# Patient Record
Sex: FEMALE | Race: WHITE | NOT HISPANIC OR LATINO | Employment: FULL TIME | URBAN - METROPOLITAN AREA
[De-identification: names, ages, dates, MRNs, and addresses within clinical notes are randomized per-mention and may not be internally consistent; named-entity substitution may affect disease eponyms.]

---

## 2017-10-20 ENCOUNTER — CONVERSION ENCOUNTER (OUTPATIENT)
Dept: MAMMOGRAPHY | Facility: HOSPITAL | Age: 47
End: 2017-10-20

## 2018-09-27 ENCOUNTER — OFFICE VISIT CONVERTED (OUTPATIENT)
Dept: FAMILY MEDICINE CLINIC | Facility: CLINIC | Age: 48
End: 2018-09-27
Attending: FAMILY MEDICINE

## 2019-02-19 ENCOUNTER — HOSPITAL ENCOUNTER (OUTPATIENT)
Dept: FAMILY MEDICINE CLINIC | Facility: CLINIC | Age: 49
Discharge: HOME | End: 2019-02-19
Attending: FAMILY MEDICINE

## 2019-02-19 ENCOUNTER — OFFICE VISIT CONVERTED (OUTPATIENT)
Dept: FAMILY MEDICINE CLINIC | Facility: CLINIC | Age: 49
End: 2019-02-19
Attending: FAMILY MEDICINE

## 2019-02-19 LAB
ALBUMIN SERPL-MCNC: 4.7 G/DL (ref 3.5–5)
ALBUMIN/GLOB SERPL: 1.3 {RATIO} (ref 1.4–2.6)
ALP SERPL-CCNC: 79 U/L (ref 42–98)
ALT SERPL-CCNC: 14 U/L (ref 10–40)
ANION GAP SERPL CALC-SCNC: 18 MMOL/L (ref 8–19)
AST SERPL-CCNC: 21 U/L (ref 15–50)
BASOPHILS # BLD AUTO: 0.05 10*3/UL (ref 0–0.2)
BASOPHILS NFR BLD AUTO: 0.9 % (ref 0–3)
BILIRUB SERPL-MCNC: 0.26 MG/DL (ref 0.2–1.3)
BUN SERPL-MCNC: 14 MG/DL (ref 5–25)
BUN/CREAT SERPL: 15 {RATIO} (ref 6–20)
CALCIUM SERPL-MCNC: 9.5 MG/DL (ref 8.7–10.4)
CHLORIDE SERPL-SCNC: 104 MMOL/L (ref 99–111)
CHOLEST SERPL-MCNC: 218 MG/DL (ref 107–200)
CHOLEST/HDLC SERPL: 4.4 {RATIO} (ref 3–6)
CONV ABS IMM GRAN: 0.01 10*3/UL (ref 0–0.2)
CONV CO2: 25 MMOL/L (ref 22–32)
CONV IMMATURE GRAN: 0.2 % (ref 0–1.8)
CONV TOTAL PROTEIN: 8.2 G/DL (ref 6.3–8.2)
CREAT UR-MCNC: 0.93 MG/DL (ref 0.5–0.9)
DEPRECATED RDW RBC AUTO: 41.1 FL (ref 36.4–46.3)
EOSINOPHIL # BLD AUTO: 0.32 10*3/UL (ref 0–0.7)
EOSINOPHIL # BLD AUTO: 5.6 % (ref 0–7)
ERYTHROCYTE [DISTWIDTH] IN BLOOD BY AUTOMATED COUNT: 12.6 % (ref 11.7–14.4)
FOLATE SERPL-MCNC: 14.3 NG/ML (ref 4.8–20)
GFR SERPLBLD BASED ON 1.73 SQ M-ARVRAT: >60 ML/MIN/{1.73_M2}
GLOBULIN UR ELPH-MCNC: 3.5 G/DL (ref 2–3.5)
GLUCOSE SERPL-MCNC: 100 MG/DL (ref 65–99)
HBA1C MFR BLD: 13.7 G/DL (ref 12–16)
HCT VFR BLD AUTO: 43.8 % (ref 37–47)
HDLC SERPL-MCNC: 49 MG/DL (ref 40–60)
LDLC SERPL CALC-MCNC: 139 MG/DL (ref 70–100)
LYMPHOCYTES # BLD AUTO: 1.64 10*3/UL (ref 1–5)
MCH RBC QN AUTO: 28 PG (ref 27–31)
MCHC RBC AUTO-ENTMCNC: 31.3 G/DL (ref 33–37)
MCV RBC AUTO: 89.6 FL (ref 81–99)
MONOCYTES # BLD AUTO: 0.5 10*3/UL (ref 0.2–1.2)
MONOCYTES NFR BLD AUTO: 8.8 % (ref 3–10)
NEUTROPHILS # BLD AUTO: 3.18 10*3/UL (ref 2–8)
NEUTROPHILS NFR BLD AUTO: 55.7 % (ref 30–85)
NRBC CBCN: 0 % (ref 0–0.7)
OSMOLALITY SERPL CALC.SUM OF ELEC: 297 MOSM/KG (ref 273–304)
PLATELET # BLD AUTO: 270 10*3/UL (ref 130–400)
PMV BLD AUTO: 10.7 FL (ref 9.4–12.3)
POTASSIUM SERPL-SCNC: 4.3 MMOL/L (ref 3.5–5.3)
RBC # BLD AUTO: 4.89 10*6/UL (ref 4.2–5.4)
SODIUM SERPL-SCNC: 143 MMOL/L (ref 135–147)
TRIGL SERPL-MCNC: 149 MG/DL (ref 40–150)
TSH SERPL-ACNC: 2.81 M[IU]/L (ref 0.27–4.2)
VARIANT LYMPHS NFR BLD MANUAL: 28.8 % (ref 20–45)
VIT B12 SERPL-MCNC: 728 PG/ML (ref 211–911)
VLDLC SERPL-MCNC: 30 MG/DL (ref 5–37)
WBC # BLD AUTO: 5.7 10*3/UL (ref 4.8–10.8)

## 2019-03-07 ENCOUNTER — OFFICE VISIT CONVERTED (OUTPATIENT)
Dept: FAMILY MEDICINE CLINIC | Facility: CLINIC | Age: 49
End: 2019-03-07
Attending: FAMILY MEDICINE

## 2019-03-07 ENCOUNTER — HOSPITAL ENCOUNTER (OUTPATIENT)
Dept: FAMILY MEDICINE CLINIC | Facility: CLINIC | Age: 49
Discharge: HOME | End: 2019-03-07
Attending: FAMILY MEDICINE

## 2019-03-07 LAB
ANION GAP SERPL CALC-SCNC: 16 MMOL/L (ref 8–19)
APPEARANCE UR: CLEAR
BILIRUB UR QL: NEGATIVE
BUN SERPL-MCNC: 11 MG/DL (ref 5–25)
BUN/CREAT SERPL: 13 {RATIO} (ref 6–20)
CALCIUM SERPL-MCNC: 9.3 MG/DL (ref 8.7–10.4)
CHLORIDE SERPL-SCNC: 102 MMOL/L (ref 99–111)
COLOR UR: YELLOW
CONV CO2: 27 MMOL/L (ref 22–32)
CONV COLLECTION SOURCE (UA): NORMAL
CONV UROBILINOGEN IN URINE BY AUTOMATED TEST STRIP: 0.2 {EHRLICHU}/DL (ref 0.1–1)
CREAT UR-MCNC: 0.83 MG/DL (ref 0.5–0.9)
CRP SERPL HS-MCNC: 0.35 MG/DL (ref 0–0.5)
ERYTHROCYTE [SEDIMENTATION RATE] IN BLOOD: 20 MM/H (ref 0–20)
EST. AVERAGE GLUCOSE BLD GHB EST-MCNC: 108 MG/DL
FOLATE SERPL-MCNC: 12.8 NG/ML (ref 4.8–20)
GFR SERPLBLD BASED ON 1.73 SQ M-ARVRAT: >60 ML/MIN/{1.73_M2}
GLUCOSE SERPL-MCNC: 91 MG/DL (ref 65–99)
GLUCOSE UR QL: NEGATIVE MG/DL
HBA1C MFR BLD: 5.4 % (ref 3.5–5.7)
HGB UR QL STRIP: NEGATIVE
KETONES UR QL STRIP: NEGATIVE MG/DL
LEUKOCYTE ESTERASE UR QL STRIP: NEGATIVE
NITRITE UR QL STRIP: NEGATIVE
OSMOLALITY SERPL CALC.SUM OF ELEC: 291 MOSM/KG (ref 273–304)
PH UR STRIP.AUTO: 5.5 [PH] (ref 5–8)
POTASSIUM SERPL-SCNC: 4 MMOL/L (ref 3.5–5.3)
PROT UR QL: NEGATIVE MG/DL
SODIUM SERPL-SCNC: 141 MMOL/L (ref 135–147)
SP GR UR: 1.01 (ref 1–1.03)
VIT B12 SERPL-MCNC: 643 PG/ML (ref 211–911)

## 2019-03-08 LAB
IRON SATN MFR SERPL: 39 % (ref 20–55)
IRON SERPL-MCNC: 138 UG/DL (ref 60–170)
TIBC SERPL-MCNC: 355 UG/DL (ref 245–450)
TRANSFERRIN SERPL-MCNC: 248 MG/DL (ref 250–380)

## 2019-03-19 ENCOUNTER — HOSPITAL ENCOUNTER (OUTPATIENT)
Dept: OTHER | Facility: HOSPITAL | Age: 49
Discharge: HOME | End: 2019-03-19
Attending: FAMILY MEDICINE

## 2019-03-22 ENCOUNTER — OFFICE VISIT CONVERTED (OUTPATIENT)
Dept: FAMILY MEDICINE CLINIC | Facility: CLINIC | Age: 49
End: 2019-03-22
Attending: NURSE PRACTITIONER

## 2019-03-28 ENCOUNTER — CONVERSION ENCOUNTER (OUTPATIENT)
Dept: FAMILY MEDICINE CLINIC | Facility: CLINIC | Age: 49
End: 2019-03-28

## 2019-03-28 ENCOUNTER — OFFICE VISIT CONVERTED (OUTPATIENT)
Dept: FAMILY MEDICINE CLINIC | Facility: CLINIC | Age: 49
End: 2019-03-28
Attending: FAMILY MEDICINE

## 2019-05-08 ENCOUNTER — OFFICE VISIT CONVERTED (OUTPATIENT)
Dept: CARDIOLOGY | Facility: CLINIC | Age: 49
End: 2019-05-08
Attending: INTERNAL MEDICINE

## 2019-05-10 ENCOUNTER — OFFICE VISIT CONVERTED (OUTPATIENT)
Dept: PULMONOLOGY | Facility: CLINIC | Age: 49
End: 2019-05-10
Attending: INTERNAL MEDICINE

## 2019-05-31 ENCOUNTER — HOSPITAL ENCOUNTER (OUTPATIENT)
Dept: FAMILY MEDICINE CLINIC | Facility: CLINIC | Age: 49
Discharge: HOME | End: 2019-05-31
Attending: NURSE PRACTITIONER

## 2019-05-31 ENCOUNTER — OFFICE VISIT CONVERTED (OUTPATIENT)
Dept: FAMILY MEDICINE CLINIC | Facility: CLINIC | Age: 49
End: 2019-05-31
Attending: NURSE PRACTITIONER

## 2019-06-01 LAB — B BURGDOR IGG+IGM SER-ACNC: <0.91 ISR (ref 0–0.9)

## 2019-06-04 LAB
R RICKETTSI IGG SER QL IA: NEGATIVE
R RICKETTSI IGM TITR SER: 0.25 INDEX (ref 0–0.89)

## 2019-07-05 ENCOUNTER — OFFICE VISIT CONVERTED (OUTPATIENT)
Dept: FAMILY MEDICINE CLINIC | Facility: CLINIC | Age: 49
End: 2019-07-05
Attending: NURSE PRACTITIONER

## 2019-08-21 ENCOUNTER — HOSPITAL ENCOUNTER (OUTPATIENT)
Dept: OTHER | Facility: HOSPITAL | Age: 49
Discharge: HOME | End: 2019-08-21
Attending: INTERNAL MEDICINE

## 2019-08-28 ENCOUNTER — OFFICE VISIT CONVERTED (OUTPATIENT)
Dept: PULMONOLOGY | Facility: CLINIC | Age: 49
End: 2019-08-28
Attending: INTERNAL MEDICINE

## 2019-12-06 ENCOUNTER — OFFICE VISIT CONVERTED (OUTPATIENT)
Dept: FAMILY MEDICINE CLINIC | Facility: CLINIC | Age: 49
End: 2019-12-06
Attending: NURSE PRACTITIONER

## 2019-12-06 ENCOUNTER — HOSPITAL ENCOUNTER (OUTPATIENT)
Dept: FAMILY MEDICINE CLINIC | Facility: CLINIC | Age: 49
Discharge: HOME | End: 2019-12-06
Attending: NURSE PRACTITIONER

## 2019-12-06 ENCOUNTER — CONVERSION ENCOUNTER (OUTPATIENT)
Dept: FAMILY MEDICINE CLINIC | Facility: CLINIC | Age: 49
End: 2019-12-06

## 2019-12-06 LAB
CRP SERPL-MCNC: 1.3 MG/L (ref 0–5)
ERYTHROCYTE [SEDIMENTATION RATE] IN BLOOD: 30 MM/H (ref 0–30)

## 2020-01-16 ENCOUNTER — OFFICE VISIT CONVERTED (OUTPATIENT)
Dept: FAMILY MEDICINE CLINIC | Facility: CLINIC | Age: 50
End: 2020-01-16
Attending: FAMILY MEDICINE

## 2020-03-24 ENCOUNTER — OFFICE VISIT CONVERTED (OUTPATIENT)
Dept: FAMILY MEDICINE CLINIC | Facility: CLINIC | Age: 50
End: 2020-03-24
Attending: FAMILY MEDICINE

## 2020-04-15 ENCOUNTER — HOSPITAL ENCOUNTER (OUTPATIENT)
Dept: FAMILY MEDICINE CLINIC | Facility: CLINIC | Age: 50
Discharge: HOME | End: 2020-04-15
Attending: FAMILY MEDICINE

## 2020-04-15 LAB
25(OH)D3 SERPL-MCNC: 42.6 NG/ML (ref 30–100)
ALBUMIN SERPL-MCNC: 4.3 G/DL (ref 3.5–5)
ALBUMIN/GLOB SERPL: 1.3 {RATIO} (ref 1.4–2.6)
ALP SERPL-CCNC: 75 U/L (ref 42–98)
ALT SERPL-CCNC: 17 U/L (ref 10–40)
ANION GAP SERPL CALC-SCNC: 17 MMOL/L (ref 8–19)
AST SERPL-CCNC: 20 U/L (ref 15–50)
BASOPHILS # BLD AUTO: 0.07 10*3/UL (ref 0–0.2)
BASOPHILS NFR BLD AUTO: 1.3 % (ref 0–3)
BILIRUB SERPL-MCNC: 0.31 MG/DL (ref 0.2–1.3)
BUN SERPL-MCNC: 18 MG/DL (ref 5–25)
BUN/CREAT SERPL: 19 {RATIO} (ref 6–20)
CALCIUM SERPL-MCNC: 9.4 MG/DL (ref 8.7–10.4)
CHLORIDE SERPL-SCNC: 106 MMOL/L (ref 99–111)
CHOLEST SERPL-MCNC: 221 MG/DL (ref 107–200)
CHOLEST/HDLC SERPL: 4.7 {RATIO} (ref 3–6)
CONV ABS IMM GRAN: 0.01 10*3/UL (ref 0–0.2)
CONV CO2: 24 MMOL/L (ref 22–32)
CONV IMMATURE GRAN: 0.2 % (ref 0–1.8)
CONV TOTAL PROTEIN: 7.5 G/DL (ref 6.3–8.2)
CREAT UR-MCNC: 0.94 MG/DL (ref 0.5–0.9)
DEPRECATED RDW RBC AUTO: 44.6 FL (ref 36.4–46.3)
EOSINOPHIL # BLD AUTO: 0.23 10*3/UL (ref 0–0.7)
EOSINOPHIL # BLD AUTO: 4.2 % (ref 0–7)
ERYTHROCYTE [DISTWIDTH] IN BLOOD BY AUTOMATED COUNT: 13.2 % (ref 11.7–14.4)
ERYTHROCYTE [SEDIMENTATION RATE] IN BLOOD: 23 MM/H (ref 0–30)
GFR SERPLBLD BASED ON 1.73 SQ M-ARVRAT: >60 ML/MIN/{1.73_M2}
GLOBULIN UR ELPH-MCNC: 3.2 G/DL (ref 2–3.5)
GLUCOSE SERPL-MCNC: 87 MG/DL (ref 65–99)
HCT VFR BLD AUTO: 41.9 % (ref 37–47)
HDLC SERPL-MCNC: 47 MG/DL (ref 40–60)
HGB BLD-MCNC: 12.9 G/DL (ref 12–16)
LDLC SERPL CALC-MCNC: 141 MG/DL (ref 70–100)
LYMPHOCYTES # BLD AUTO: 1.93 10*3/UL (ref 1–5)
LYMPHOCYTES NFR BLD AUTO: 35.2 % (ref 20–45)
MCH RBC QN AUTO: 28.5 PG (ref 27–31)
MCHC RBC AUTO-ENTMCNC: 30.8 G/DL (ref 33–37)
MCV RBC AUTO: 92.5 FL (ref 81–99)
MONOCYTES # BLD AUTO: 0.47 10*3/UL (ref 0.2–1.2)
MONOCYTES NFR BLD AUTO: 8.6 % (ref 3–10)
NEUTROPHILS # BLD AUTO: 2.78 10*3/UL (ref 2–8)
NEUTROPHILS NFR BLD AUTO: 50.5 % (ref 30–85)
NRBC CBCN: 0 % (ref 0–0.7)
OSMOLALITY SERPL CALC.SUM OF ELEC: 295 MOSM/KG (ref 273–304)
PLATELET # BLD AUTO: 258 10*3/UL (ref 130–400)
PMV BLD AUTO: 11.5 FL (ref 9.4–12.3)
POTASSIUM SERPL-SCNC: 4.5 MMOL/L (ref 3.5–5.3)
RBC # BLD AUTO: 4.53 10*6/UL (ref 4.2–5.4)
SODIUM SERPL-SCNC: 142 MMOL/L (ref 135–147)
TRIGL SERPL-MCNC: 163 MG/DL (ref 40–150)
VLDLC SERPL-MCNC: 33 MG/DL (ref 5–37)
WBC # BLD AUTO: 5.49 10*3/UL (ref 4.8–10.8)

## 2020-04-22 ENCOUNTER — HOSPITAL ENCOUNTER (OUTPATIENT)
Dept: FAMILY MEDICINE CLINIC | Facility: CLINIC | Age: 50
Discharge: HOME | End: 2020-04-22
Attending: FAMILY MEDICINE

## 2020-04-22 LAB
ANION GAP SERPL CALC-SCNC: 18 MMOL/L (ref 8–19)
BUN SERPL-MCNC: 11 MG/DL (ref 5–25)
BUN/CREAT SERPL: 12 {RATIO} (ref 6–20)
CALCIUM SERPL-MCNC: 9.5 MG/DL (ref 8.7–10.4)
CHLORIDE SERPL-SCNC: 101 MMOL/L (ref 99–111)
CONV CO2: 25 MMOL/L (ref 22–32)
CREAT UR-MCNC: 0.94 MG/DL (ref 0.5–0.9)
GFR SERPLBLD BASED ON 1.73 SQ M-ARVRAT: >60 ML/MIN/{1.73_M2}
GLUCOSE SERPL-MCNC: 74 MG/DL (ref 65–99)
OSMOLALITY SERPL CALC.SUM OF ELEC: 286 MOSM/KG (ref 273–304)
POTASSIUM SERPL-SCNC: 4.5 MMOL/L (ref 3.5–5.3)
SODIUM SERPL-SCNC: 139 MMOL/L (ref 135–147)

## 2020-06-10 ENCOUNTER — HOSPITAL ENCOUNTER (OUTPATIENT)
Dept: OTHER | Facility: HOSPITAL | Age: 50
Discharge: HOME | End: 2020-06-10
Attending: INTERNAL MEDICINE

## 2020-07-10 ENCOUNTER — HOSPITAL ENCOUNTER (OUTPATIENT)
Dept: FAMILY MEDICINE CLINIC | Facility: CLINIC | Age: 50
Discharge: HOME | End: 2020-07-10
Attending: INTERNAL MEDICINE

## 2020-07-10 LAB
ALBUMIN SERPL-MCNC: 4.7 G/DL (ref 3.5–5)
ALT SERPL-CCNC: 12 U/L (ref 10–40)
AST SERPL-CCNC: 19 U/L (ref 15–50)
BASOPHILS # BLD AUTO: 0.08 10*3/UL (ref 0–0.2)
BASOPHILS NFR BLD AUTO: 1.6 % (ref 0–3)
BUN SERPL-MCNC: 15 MG/DL (ref 5–25)
CONV ABS IMM GRAN: 0 10*3/UL (ref 0–0.2)
CONV IMMATURE GRAN: 0 % (ref 0–1.8)
CREAT UR-MCNC: 0.93 MG/DL (ref 0.5–0.9)
DEPRECATED RDW RBC AUTO: 48.1 FL (ref 36.4–46.3)
EOSINOPHIL # BLD AUTO: 0.16 10*3/UL (ref 0–0.7)
EOSINOPHIL # BLD AUTO: 3.2 % (ref 0–7)
ERYTHROCYTE [DISTWIDTH] IN BLOOD BY AUTOMATED COUNT: 12.9 % (ref 11.7–14.4)
ERYTHROCYTE [SEDIMENTATION RATE] IN BLOOD: 12 MM/H (ref 0–30)
HCT VFR BLD AUTO: 43.8 % (ref 37–47)
HGB BLD-MCNC: 12.8 G/DL (ref 12–16)
LYMPHOCYTES # BLD AUTO: 1.8 10*3/UL (ref 1–5)
LYMPHOCYTES NFR BLD AUTO: 35.6 % (ref 20–45)
MCH RBC QN AUTO: 29.5 PG (ref 27–31)
MCHC RBC AUTO-ENTMCNC: 29.2 G/DL (ref 33–37)
MCV RBC AUTO: 100.9 FL (ref 81–99)
MONOCYTES # BLD AUTO: 0.54 10*3/UL (ref 0.2–1.2)
MONOCYTES NFR BLD AUTO: 10.7 % (ref 3–10)
NEUTROPHILS # BLD AUTO: 2.47 10*3/UL (ref 2–8)
NEUTROPHILS NFR BLD AUTO: 48.9 % (ref 30–85)
NRBC CBCN: 0 % (ref 0–0.7)
PLATELET # BLD AUTO: 240 10*3/UL (ref 130–400)
PMV BLD AUTO: 12.1 FL (ref 9.4–12.3)
RBC # BLD AUTO: 4.34 10*6/UL (ref 4.2–5.4)
WBC # BLD AUTO: 5.05 10*3/UL (ref 4.8–10.8)

## 2020-08-31 ENCOUNTER — OFFICE VISIT CONVERTED (OUTPATIENT)
Dept: FAMILY MEDICINE CLINIC | Facility: CLINIC | Age: 50
End: 2020-08-31
Attending: FAMILY MEDICINE

## 2020-08-31 ENCOUNTER — HOSPITAL ENCOUNTER (OUTPATIENT)
Dept: FAMILY MEDICINE CLINIC | Facility: CLINIC | Age: 50
Discharge: HOME | End: 2020-08-31
Attending: FAMILY MEDICINE

## 2020-08-31 LAB
ALBUMIN SERPL-MCNC: 4.5 G/DL (ref 3.5–5)
ALBUMIN/GLOB SERPL: 1.5 {RATIO} (ref 1.4–2.6)
ALP SERPL-CCNC: 68 U/L (ref 53–141)
ALT SERPL-CCNC: 16 U/L (ref 10–40)
ANION GAP SERPL CALC-SCNC: 19 MMOL/L (ref 8–19)
AST SERPL-CCNC: 28 U/L (ref 15–50)
BASOPHILS # BLD AUTO: 0.09 10*3/UL (ref 0–0.2)
BASOPHILS NFR BLD AUTO: 1.4 % (ref 0–3)
BILIRUB SERPL-MCNC: 0.29 MG/DL (ref 0.2–1.3)
BUN SERPL-MCNC: 13 MG/DL (ref 5–25)
BUN/CREAT SERPL: 13 {RATIO} (ref 6–20)
CALCIUM SERPL-MCNC: 10.2 MG/DL (ref 8.7–10.4)
CHLORIDE SERPL-SCNC: 104 MMOL/L (ref 99–111)
CONV ABS IMM GRAN: 0.02 10*3/UL (ref 0–0.2)
CONV CO2: 23 MMOL/L (ref 22–32)
CONV IMMATURE GRAN: 0.3 % (ref 0–1.8)
CONV TOTAL PROTEIN: 7.6 G/DL (ref 6.3–8.2)
CREAT UR-MCNC: 1 MG/DL (ref 0.5–0.9)
DEPRECATED RDW RBC AUTO: 47.7 FL (ref 36.4–46.3)
EOSINOPHIL # BLD AUTO: 0.15 10*3/UL (ref 0–0.7)
EOSINOPHIL # BLD AUTO: 2.4 % (ref 0–7)
ERYTHROCYTE [DISTWIDTH] IN BLOOD BY AUTOMATED COUNT: 13.7 % (ref 11.7–14.4)
FOLATE SERPL-MCNC: >20 NG/ML (ref 4.8–20)
GFR SERPLBLD BASED ON 1.73 SQ M-ARVRAT: >60 ML/MIN/{1.73_M2}
GLOBULIN UR ELPH-MCNC: 3.1 G/DL (ref 2–3.5)
GLUCOSE SERPL-MCNC: 87 MG/DL (ref 65–99)
HCT VFR BLD AUTO: 40.9 % (ref 37–47)
HGB BLD-MCNC: 12.8 G/DL (ref 12–16)
IRON SATN MFR SERPL: 44 % (ref 20–55)
IRON SERPL-MCNC: 140 UG/DL (ref 60–170)
LYMPHOCYTES # BLD AUTO: 2.04 10*3/UL (ref 1–5)
LYMPHOCYTES NFR BLD AUTO: 32.4 % (ref 20–45)
MCH RBC QN AUTO: 30 PG (ref 27–31)
MCHC RBC AUTO-ENTMCNC: 31.3 G/DL (ref 33–37)
MCV RBC AUTO: 95.8 FL (ref 81–99)
MONOCYTES # BLD AUTO: 0.55 10*3/UL (ref 0.2–1.2)
MONOCYTES NFR BLD AUTO: 8.7 % (ref 3–10)
NEUTROPHILS # BLD AUTO: 3.45 10*3/UL (ref 2–8)
NEUTROPHILS NFR BLD AUTO: 54.8 % (ref 30–85)
NRBC CBCN: 0 % (ref 0–0.7)
OSMOLALITY SERPL CALC.SUM OF ELEC: 293 MOSM/KG (ref 273–304)
PLATELET # BLD AUTO: 272 10*3/UL (ref 130–400)
PMV BLD AUTO: 12 FL (ref 9.4–12.3)
POTASSIUM SERPL-SCNC: 4.4 MMOL/L (ref 3.5–5.3)
RBC # BLD AUTO: 4.27 10*6/UL (ref 4.2–5.4)
SODIUM SERPL-SCNC: 142 MMOL/L (ref 135–147)
T4 FREE SERPL-MCNC: 1.1 NG/DL (ref 0.9–1.8)
TIBC SERPL-MCNC: 319 UG/DL (ref 245–450)
TRANSFERRIN SERPL-MCNC: 223 MG/DL (ref 250–380)
TSH SERPL-ACNC: 1.63 M[IU]/L (ref 0.27–4.2)
VIT B12 SERPL-MCNC: 972 PG/ML (ref 211–911)
WBC # BLD AUTO: 6.3 10*3/UL (ref 4.8–10.8)

## 2020-09-02 LAB — BACTERIA UR CULT: NORMAL

## 2020-09-08 ENCOUNTER — HOSPITAL ENCOUNTER (OUTPATIENT)
Dept: FAMILY MEDICINE CLINIC | Facility: CLINIC | Age: 50
Discharge: HOME | End: 2020-09-08
Attending: FAMILY MEDICINE

## 2020-09-08 LAB
ANION GAP SERPL CALC-SCNC: 19 MMOL/L (ref 8–19)
BUN SERPL-MCNC: 12 MG/DL (ref 5–25)
BUN/CREAT SERPL: 12 {RATIO} (ref 6–20)
CALCIUM SERPL-MCNC: 9.3 MG/DL (ref 8.7–10.4)
CHLORIDE SERPL-SCNC: 103 MMOL/L (ref 99–111)
CONV CO2: 23 MMOL/L (ref 22–32)
CREAT UR-MCNC: 1 MG/DL (ref 0.5–0.9)
GFR SERPLBLD BASED ON 1.73 SQ M-ARVRAT: >60 ML/MIN/{1.73_M2}
GLUCOSE SERPL-MCNC: 107 MG/DL (ref 65–99)
OSMOLALITY SERPL CALC.SUM OF ELEC: 292 MOSM/KG (ref 273–304)
POTASSIUM SERPL-SCNC: 3.9 MMOL/L (ref 3.5–5.3)
SODIUM SERPL-SCNC: 141 MMOL/L (ref 135–147)

## 2020-10-20 ENCOUNTER — CONVERSION ENCOUNTER (OUTPATIENT)
Dept: FAMILY MEDICINE CLINIC | Facility: CLINIC | Age: 50
End: 2020-10-20

## 2020-10-20 ENCOUNTER — OFFICE VISIT CONVERTED (OUTPATIENT)
Dept: FAMILY MEDICINE CLINIC | Facility: CLINIC | Age: 50
End: 2020-10-20
Attending: NURSE PRACTITIONER

## 2020-10-20 ENCOUNTER — HOSPITAL ENCOUNTER (OUTPATIENT)
Dept: FAMILY MEDICINE CLINIC | Facility: CLINIC | Age: 50
Discharge: HOME | End: 2020-10-20
Attending: NURSE PRACTITIONER

## 2020-10-27 ENCOUNTER — OFFICE VISIT CONVERTED (OUTPATIENT)
Dept: PODIATRY | Facility: CLINIC | Age: 50
End: 2020-10-27
Attending: PODIATRIST

## 2020-11-23 ENCOUNTER — HOSPITAL ENCOUNTER (OUTPATIENT)
Dept: FAMILY MEDICINE CLINIC | Facility: CLINIC | Age: 50
Discharge: HOME | End: 2020-11-23
Attending: FAMILY MEDICINE

## 2020-11-23 ENCOUNTER — OFFICE VISIT CONVERTED (OUTPATIENT)
Dept: FAMILY MEDICINE CLINIC | Facility: CLINIC | Age: 50
End: 2020-11-23
Attending: FAMILY MEDICINE

## 2020-11-23 ENCOUNTER — CONVERSION ENCOUNTER (OUTPATIENT)
Dept: FAMILY MEDICINE CLINIC | Facility: CLINIC | Age: 50
End: 2020-11-23

## 2020-11-30 ENCOUNTER — HOSPITAL ENCOUNTER (OUTPATIENT)
Dept: OTHER | Facility: HOSPITAL | Age: 50
Discharge: HOME | End: 2020-11-30
Attending: FAMILY MEDICINE

## 2021-03-01 ENCOUNTER — OFFICE VISIT CONVERTED (OUTPATIENT)
Dept: FAMILY MEDICINE CLINIC | Facility: CLINIC | Age: 51
End: 2021-03-01
Attending: NURSE PRACTITIONER

## 2021-05-09 VITALS
BODY MASS INDEX: 26.86 KG/M2 | SYSTOLIC BLOOD PRESSURE: 118 MMHG | TEMPERATURE: 97.6 F | HEART RATE: 105 BPM | OXYGEN SATURATION: 98 % | WEIGHT: 171.12 LBS | DIASTOLIC BLOOD PRESSURE: 80 MMHG | HEIGHT: 67 IN

## 2021-05-09 VITALS
OXYGEN SATURATION: 97 % | DIASTOLIC BLOOD PRESSURE: 80 MMHG | BODY MASS INDEX: 25.47 KG/M2 | HEIGHT: 67 IN | SYSTOLIC BLOOD PRESSURE: 128 MMHG | TEMPERATURE: 97.5 F | HEART RATE: 83 BPM | WEIGHT: 162.25 LBS

## 2021-05-09 VITALS
HEIGHT: 67 IN | SYSTOLIC BLOOD PRESSURE: 120 MMHG | DIASTOLIC BLOOD PRESSURE: 80 MMHG | TEMPERATURE: 98.4 F | WEIGHT: 167.5 LBS | OXYGEN SATURATION: 97 % | HEART RATE: 100 BPM | BODY MASS INDEX: 26.29 KG/M2

## 2021-05-09 VITALS
TEMPERATURE: 97.4 F | WEIGHT: 165 LBS | OXYGEN SATURATION: 97 % | SYSTOLIC BLOOD PRESSURE: 130 MMHG | DIASTOLIC BLOOD PRESSURE: 80 MMHG | HEART RATE: 95 BPM

## 2021-05-09 VITALS
OXYGEN SATURATION: 98 % | DIASTOLIC BLOOD PRESSURE: 76 MMHG | HEART RATE: 90 BPM | WEIGHT: 172.25 LBS | SYSTOLIC BLOOD PRESSURE: 126 MMHG | BODY MASS INDEX: 27.03 KG/M2 | HEIGHT: 67 IN | TEMPERATURE: 97.8 F

## 2021-05-09 VITALS
SYSTOLIC BLOOD PRESSURE: 120 MMHG | HEIGHT: 67 IN | HEART RATE: 90 BPM | DIASTOLIC BLOOD PRESSURE: 80 MMHG | WEIGHT: 172 LBS | OXYGEN SATURATION: 99 % | BODY MASS INDEX: 27 KG/M2

## 2021-05-09 VITALS
OXYGEN SATURATION: 97 % | TEMPERATURE: 97.8 F | WEIGHT: 168.12 LBS | BODY MASS INDEX: 26.39 KG/M2 | HEIGHT: 67 IN | SYSTOLIC BLOOD PRESSURE: 148 MMHG | HEART RATE: 122 BPM | DIASTOLIC BLOOD PRESSURE: 90 MMHG

## 2021-05-09 VITALS
WEIGHT: 164.12 LBS | OXYGEN SATURATION: 98 % | SYSTOLIC BLOOD PRESSURE: 130 MMHG | HEART RATE: 107 BPM | DIASTOLIC BLOOD PRESSURE: 90 MMHG | BODY MASS INDEX: 25.76 KG/M2 | HEIGHT: 67 IN | TEMPERATURE: 97.8 F

## 2021-05-09 VITALS
WEIGHT: 168.37 LBS | SYSTOLIC BLOOD PRESSURE: 134 MMHG | OXYGEN SATURATION: 97 % | TEMPERATURE: 97.9 F | HEART RATE: 95 BPM | DIASTOLIC BLOOD PRESSURE: 84 MMHG | HEIGHT: 67 IN | BODY MASS INDEX: 26.43 KG/M2

## 2021-05-09 VITALS
DIASTOLIC BLOOD PRESSURE: 78 MMHG | TEMPERATURE: 97.4 F | SYSTOLIC BLOOD PRESSURE: 125 MMHG | WEIGHT: 169.5 LBS | HEART RATE: 89 BPM | OXYGEN SATURATION: 95 %

## 2021-05-09 VITALS
DIASTOLIC BLOOD PRESSURE: 80 MMHG | HEART RATE: 96 BPM | HEIGHT: 67 IN | SYSTOLIC BLOOD PRESSURE: 140 MMHG | TEMPERATURE: 98.4 F | OXYGEN SATURATION: 98 % | WEIGHT: 160.12 LBS | BODY MASS INDEX: 25.13 KG/M2

## 2021-05-09 VITALS
HEART RATE: 109 BPM | TEMPERATURE: 98.8 F | SYSTOLIC BLOOD PRESSURE: 142 MMHG | OXYGEN SATURATION: 98 % | WEIGHT: 169.56 LBS | DIASTOLIC BLOOD PRESSURE: 82 MMHG

## 2021-05-09 VITALS
TEMPERATURE: 97.1 F | DIASTOLIC BLOOD PRESSURE: 74 MMHG | OXYGEN SATURATION: 95 % | SYSTOLIC BLOOD PRESSURE: 114 MMHG | WEIGHT: 166 LBS | HEART RATE: 83 BPM

## 2021-05-09 VITALS
HEART RATE: 90 BPM | SYSTOLIC BLOOD PRESSURE: 131 MMHG | OXYGEN SATURATION: 96 % | DIASTOLIC BLOOD PRESSURE: 60 MMHG | TEMPERATURE: 98.5 F | WEIGHT: 163 LBS

## 2021-05-10 NOTE — H&P
History and Physical      Patient Name: Nora Walker   Patient ID: [id number]   Sex: Female   YOB: 1970    Primary Care Provider: Zaid Renteria MD   Referring Provider: Zaid Renteria MD    Visit Date: October 27, 2020    Provider: Gabriel Houston DPM   Location: [facility]   Location Address: [address] [place] [address]  [id number]   Location Phone: [phone]          Chief Complaint  · Right Foot Pain      History Of Present Illness  Nora Walker presents to the office today for evaluation and treatment of      New, Established, New Problem:  new  Location:  Right 2nd inner space  Duration:  2002, worsened in 2009  Onset:  insidious  Nature:  sore, shooting  Stable, worsening, improving:  worsening    Aggravating factors:   Patient relates pain is aggravated by shoe gear and ambulation.   Previous Treatment:  cortisone injections by Dr. Houston in 2010.    Three low back surgeries including a lumbar fusion in 2015 at [facility].    Patient denies any fevers, chills, nausea, vomiting, shortness of breathe, nor any other constitutional signs nor symptoms.    Patient states that they work as a  at a [employer]; part-time.    Pt states she also had a cortisone injection in her Right hip today.    Improved with Cymbalta.       Past Medical History  Numbness in feet; Rheumatoid arthritis         Past Surgical History  Back surgery; Hysterectomy; Nissen Fundoplication; Wrist Surgery         Medication List  cyclobenzaprine 10 mg oral tablet; Cymbalta 30 mg oral capsule,delayed release(DR/EC); meloxicam 7.5 mg oral tablet         Family Medical History  FH: lung cancer         Social History  Alcohol (Current some day); Tobacco (Never)         Review of Systems  · Constitutional  o Denies  o : fatigue, night sweats  · Eyes  o Denies  o : double vision, blurred vision  · HENT  o Denies  o : vertigo, recent head injury  · Cardiovascular  o Denies  o : chest  "pain, irregular heart beats  · Respiratory  o Denies  o : shortness of breath, productive cough  · Gastrointestinal  o Denies  o : nausea, vomiting  · Genitourinary  o Denies  o : dysuria, urinary retention  · Integument  o Denies  o : hair growth change, new skin lesions  · Neurologic  o * See HPI  · Musculoskeletal  o Denies  o : joint swelling, limitation of motion  · Endocrine  o Denies  o : cold intolerance, heat intolerance  · Heme-Lymph  o Denies  o : petechiae, lymph node enlargement or tenderness  · Allergic-Immunologic  o Denies  o : frequent illnesses      Vitals  Date Time BP Position Site L\R Cuff Size HR RR TEMP (F) WT  HT  BMI kg/m2 BSA m2 O2 Sat FR L/min FiO2 HC       10/27/2020 01:44 /89 Sitting    97 - R  97.9 173lbs 6oz 5'  7.5\" 26.75 1.94 100 %            Physical Examination  · Constitutional  o Appearance  o : The patient is awake, well developed, well nourished, well groomed  · Cardiovascular  o Peripheral Vascular System  o :   § Pedal Pulses  § : 2+ and symmetrical   § Extremities  § : There is no edema of the lower extremities  · Musculoskeletal  o Extremeties/Joint  o : Lower extremity muscle strength and range of motion is equal and symmetrical bilaterally. The knees are noted to be in normal alignment. Ankle alignment and range of motion is normal and foot structure is normal. Subtalar, metatarsal and metatarsal-phalangeal joint range of motion is noted to be within normal limits. The digits of both feet are in normal alignment. The gait is normal.   · Skin and Subcutaneous Tissue  o General Inspection  o : Skin is noted to have normal texture and turgor, with no excrescences noted.   o Digits and Nails  o : The toenails are noted to be without disease.  · Neurologic  o Sensation  o : RIGHT: Paris-Chava 5.07 monofilament diminished to all assessed areas. Sharp/dull sensation is decreased. LEFT: Sharp/dull sensation is within normal limits. Paris-Chava 5.07 monofilament " intact to all assessed areas. There is pain on palpation at the level of Tinels sign with Mulders sign in Right 2nd inner space. No signs of edema, erythema, lymphangitis, nor signs of infection.      Dr. Houston reviewed radiographs and results from [facility] and discussed them with the patient.  These are significant for no periosteal reactions nor osteolytic changes seen.  No occult fractures seen.           Assessment  · Foot pain, right     729.5/M79.671  · Limb pain     729.5/M79.609  · Lumbar radiculopathy     724.4/M54.16      Plan  · Orders  o PODIATRY CONSULTATION (PODIA) - - 10/27/2020  · Medications  o Medications have been Reconciled  o Transition of Care or Provider Policy  · Instructions  o Follow Up as Needed  o I have discussed the findings of this evaluation with the patient. The discussion included a complete verbal explanation of any changes in the examination results, diagnosis, and the current treatment plan. A schedule for future care needs was explained. If any questions should arise after returning home, I have encouraged the patient to feel free to contact Dr. Houston. The patient states understanding and agreement with this plan.   o Pt to monitor for problems and to contact Dr. Houston for follow-up should such signs occur. Patient states understanding and agreement with this plan.   o The need for a referral to another physician was discussed with the patient. They state understanding and agreement with this plan. Dr. Tavarez.  o Electronically Identified Patient Education Materials Provided Electronically  · Disposition  o Call or Return if symptoms worsen or persist.  · Referrals  o ID: [id number] Date: 10/21/2020 Type: Inbound  Specialty: Podiatry            Electronically Signed by: Gabriel Houston DPM -Author on October 27, 2020 02:19:09 PM

## 2021-05-14 VITALS
HEART RATE: 97 BPM | SYSTOLIC BLOOD PRESSURE: 118 MMHG | DIASTOLIC BLOOD PRESSURE: 89 MMHG | TEMPERATURE: 97.9 F | BODY MASS INDEX: 27.21 KG/M2 | HEIGHT: 67 IN | OXYGEN SATURATION: 100 % | WEIGHT: 173.37 LBS

## 2021-05-15 VITALS
HEART RATE: 84 BPM | BODY MASS INDEX: 26.68 KG/M2 | SYSTOLIC BLOOD PRESSURE: 130 MMHG | HEIGHT: 67 IN | WEIGHT: 170 LBS | DIASTOLIC BLOOD PRESSURE: 92 MMHG

## 2021-05-28 VITALS
TEMPERATURE: 98.2 F | HEART RATE: 99 BPM | SYSTOLIC BLOOD PRESSURE: 147 MMHG | HEIGHT: 67 IN | DIASTOLIC BLOOD PRESSURE: 76 MMHG | OXYGEN SATURATION: 100 % | RESPIRATION RATE: 14 BRPM | RESPIRATION RATE: 12 BRPM | SYSTOLIC BLOOD PRESSURE: 117 MMHG | BODY MASS INDEX: 26.44 KG/M2 | BODY MASS INDEX: 26.25 KG/M2 | OXYGEN SATURATION: 99 % | DIASTOLIC BLOOD PRESSURE: 98 MMHG | HEART RATE: 84 BPM | HEIGHT: 67 IN | WEIGHT: 167.25 LBS | WEIGHT: 168.44 LBS | TEMPERATURE: 97.8 F

## 2021-05-28 NOTE — PROGRESS NOTES
Patient: CASPER DIEGO     Acct: [id number]     Report: #[id number]  UNIT #: [id number]     : 1970    Encounter Date:2019  PRIMARY CARE: VIKASH PASCAL  ***Signed***  --------------------------------------------------------------------------------------------------------------------  Chief Complaint      Encounter Date      Aug 28, 2019            Primary Care Provider      VIKASH PASCAL            Referring Provider      VIKASH PASCAL            Patient Complaint      Patient is complaining of      f/u chest ct results            VITALS      Height 5 ft 7 in / 170.18 cm      Weight 167 lbs 4 oz / 75.408788 kg      BSA 1.87 m2      BMI 26.2 kg/m2      Temperature 98.2 F / 36.78 C - Oral      Pulse 99      Respirations 12      Blood Pressure 147/98 Sitting, Left Arm      Pulse Oximetry 99%, roomair            HPI      The patient is a very pleasant 49 year old  female never smoker here     for follow up of abnormal CT scan of the chest.             Since her last office visit she had complete resolution of parenchymal lung     ground glass opacities as well as complete resolution of mediastinal hilar     adenopathy. She feels better.  She denies any dyspnea, cough, wheezing,     headaches and hemoptysis or chest pain. She denies any nausea or vomiting, fever    or chills or fatigue.  She is able to perform her ADL's without difficulty and     denies any swollen glands in her lymph nodes, head or neck.            I personally reviewed Review of Systems, family, social, surgical and medical     history and agree with their findings.            ROS      Constitutional:  Denies: Fatigue, Fever, Weight gain, Weight loss, Chills,     Insomnia, Other      Respiratory/Breathing:  Complains of: Cough; Denies: Shortness of air, Wheezing,    Hemoptysis, Pleuritic pain, Other      Endocrine:  Denies: Polydipsia, Polyuria, Heat/cold intolerance, Abnorml     menstrual pattern,  Diabetes, Other      Eyes:  Denies: Blurred vision, Vision Changes, Other      Ears, nose, mouth, throat:  Denies: Mouth lesions, Thrush, Throat pain,     Hoarseness, Allergies/Hay Fever, Post Nasal Drip, Headaches, Recent Head Injury,    Nose Bleeding, Neck Stiffness, Thyroid Mass, Hearing Loss, Ear Fullness, Dry     Mouth, Nasal or Sinus Pain, Dry Lips, Nasal discharge, Nasal congestion, Other      Cardiovascular:  Denies: Palpitations, Syncope, Claudication, Chest Pain, Wake     up Gasping for air, Leg Swelling, Irregular Heart Rate, Cyanosis, Dyspnea on     Exertion, Other      Gastrointestinal:  Denies: Nausea, Constipation, Diarrhea, Abdominal pain,     Vomiting, Difficulty Swallowing, Reflux/Heartburn, Dysphagia, Jaundice,     Bloating, Melena, Bloody stools, Other      Genitourinary:  Denies: Urinary frequency, Incontinence, Hematuria, Urgency,     Nocturia, Dysuria, Testicular problems, Other      Musculoskeletal:  Denies: Joint Pain, Joint Stiffness, Joint Swelling, Myalgias,    Other      Hematologic/lymphatic:  DENIES: Lymphadenopathy, Bruising, Bleeding tendencies,     Other      Neurological:  Denies: Headache, Numbness, Weakness, Seizures, Other      Psychiatric:  Denies: Anxiety, Appropriate Effect, Depression, Other      Sleep:  No: Excessive daytime sleep, Morning Headache?, Snoring, Insomnia?, Stop    breathing at sleep?, Other      Integumentary:  Denies: Rash, Dry skin, Skin Warm to Touch, Other      Immunologic/Allergic:  Denies: Latex allergy, Seasonal allergies, Asthma,     Urticaria, Eczema, Other      Immunization status:  No: Up to date            FAMILY/SOCIAL/MEDICAL HX      Surgical History:  Yes: Abdominal Surgery ( LAP. 2000), Back Surgery (fusion),     Orthopedic Surgery (right wrist), Other Surgeries (Acid reflux surg); No:     Appendectomy, Bladder Surgery, Bowel Surgery, CABG, Cholecystectomy, Head     Surgery, Oral Surgery, Vascular Surgery      Cancer/Type - Family Hx:  Father,  Sister      Other Family Medical History:  Mother      Is Father Still Living?:  No      Is Mother Still Living?:  No       Family History:  Yes      Social History:  No Tobacco Use, No Alcohol Use, No Recreational Drug use      Smoking status:  Never smoker      Hysterectomy:  Yes      Anticoagulation Therapy:  No      Antibiotic Prophylaxis:  No      Medical History:  Yes: Allergies, Arthritis (RHEUMATOID), Hemorrhoids/Rectal     Prob (HAD GASTRIC SURGERY), High Blood Pressure, High Cholesterol, Reflux     Disease; No: Asthma, Blood Disease, Chemotherapy/Cancer, Congestive Heart Failu,    Deafness or Ringing Ears, Diabetes, Seizures, Heart Attack, Shortness Of Breath,    Sinus Trouble, Miscellaneous Medical/oth      Psychiatric History      none            PREVENTION      Hx Influenza Vaccination:  Yes      Date Influenza Vaccine Given:  Oct 1, 2018      Influenza Vaccine Declined:  No      2 or More Falls Past Year?:  No      Fall Past Year with Injury?:  No      Hx Pneumococcal Vaccination:  No      Encouraged to follow-up with:  PCP regarding preventative exams.      Chart initiated by      devora/ ma            ALLERGIES/MEDICATIONS      Allergies:        Coded Allergies:             NO KNOWN DRUG ALLERGIES (Verified  Allergy, Unknown, 8/28/19)      Medications    Last Reconciled on 8/28/19 08:46 by JAJA TATE MD      (L-citrulline)   No Conflict Check               Reported         5/10/19       Loratadine (Claritin) 10 Mg Tablet      10 MG PO HS, #30 TAB 0 Refills         Reported         5/10/19       Montelukast Sodium (Singulair*) 10 Mg Tablet      10 MG PO QDAY, #30 TAB 0 Refills         Reported         5/10/19       Diclofenac Sodium (Voltaren 1%*) 100 Gm Gel..gram.      1 APL TOPICAL TID, #1 TUBE         Prov: Sattaur,Zamil cfr         8/27/18       Meloxicam (Mobic*) 15 Mg Tablet      15 MG PO HS, #30 TAB         Prov: Sattaur,Zamil cfr         8/27/18      Current Medications       Current Medications Reviewed 19            EXAM      Vital Signs Reviewed      Gen: WDWN, Alert, NAD.        HEENT:  PERRL, EOMI.  OP, nares clear, no sinus tenderness.      Neck:  Supple, no JVD, no thyromegaly.      Lymph: No axillary, cervical, supraclavicular lymphadenopathy noted bilaterally.      Chest:  Good aeration, clear to auscultation bilaterally, tympanic to percussion    bilaterally, no work of breathing noted.      CV:  RRR, no MGR, pulses 2+, equal.      Abd:  Soft, NT, ND, + BS, no HSM.      EXT:  No clubbing, no cyanosis, no edema, no joint tenderness.       Neuro:  A  Skin: No rashes or lesions.      Vtials      Vitals:             Height 5 ft 7 in / 170.18 cm           Weight 167 lbs 4 oz / 75.104065 kg           BSA 1.87 m2           BMI 26.2 kg/m2           Temperature 98.2 F / 36.78 C - Oral           Pulse 99           Respirations 12           Blood Pressure 147/98 Sitting, Left Arm           Pulse Oximetry 99%, roomair            REVIEW      Results Reviewed      PCCS Results Reviewed?:  Yes Prev Radiology Results, Yes Previous [facility] Records      Radiographic Results               [facility]                PACS RADIOLOGY REPORT            Patient: CASPER DIEGO   Acct: #[id number]   Report: #[id number]            UNIT #: [id number]    DOS: 19 1108      INSURANCE:[employer]   ORDER #:[id number]      LOCATION:[facility]     : 1970            PROVIDERS      ADMITTING:     ATTENDING: JAJA TATE      FAMILY:  NONE,MD   ORDERING:  JAJA TATE         OTHER:    DICTATING:  Richard Velasquez MD            REQ #:[id number]   [facility]      REASON FOR EXAM:        REASON FOR VISIT:  ENLARGED LYMPHNODES            *******Signed******         PROCEDURE:   CT CHEST WITHOUT CONTRAST             COMPARISON:   [facility], CT, CHEST W/ CONTRAST, 3/19/2019,      8:51.             INDICATIONS:   LOCALIZED ENLARGED LYMPH NODES             TECHNIQUE:   CT images were created without the administration of contrast     material.               PROTOCOL:     Standard imaging protocol performed                RADIATION:     DLP: 429mGy*cm          Automated exposure control was utilized to minimize radiation dose.              FINDINGS:         There is no mediastinal, hilar, or axillary adenopathy.  Again seen is some     increased soft tissue       attenuation within the anterior mediastinum most consistent with residual thymic    tissue.  There are       no pleural effusions.  Lungs are clear bilaterally.  No suspicious pulmonary     nodules identified.        Previously demonstrated ground-glass areas within the bilateral lower lobes pos    teriorly have       cleared.  Bilateral adrenal glands are within normal limits.  Within the     posterior right lobe of       the liver there is a 1 cm low-density mass unchanged from prior study compatible    with a cyst or       hemangioma.  Other visualized portions of the upper abdomen are unremarkable.      No suspicious lytic       or sclerotic bony lesion is identified.             CONCLUSION:         1. Interval clearing of ground-glass nodular densities in both lungs.  The lungs    are now clear       bilaterally.      2. Persistent soft tissue within the anterior mediastinum favored to be residual    thymic tissue.              JAEN FLORES MD             Electronically Signed and Approved By: JEAN FLORES MD on 8/21/2019 at 12:42                           Until signed, this is an unconfirmed preliminary report that may contain      errors and is subject to change.                                              STEBA:      D:08/21/19 1242            Assessment      IMPRESSION:      1. Ground glass infiltrates of lung resolved.       2. Mediastinal adenopathy resolved.       3. Residual thymus.       4. Cough resolved.       5.  Seasonal allergies well controlled.             PLAN:      1. I personally reviewed the patient's CT scan showing resolution of     lymphadenopathy and lung infiltrates. She also has residual thymus that is     stable. No indication to follow up chest imaging at this time as everything has     resolved.       2. Continue Singulair and claritin.  Cough is resolved.       3. Up to date with flu vaccine, no indication for prevnar or pneumovax.      4. Follow up as needed.                 Disclaimer: Converted document may not contain table formatting or lab diagrams. Please see [employer] for the authenticated document.

## 2021-05-28 NOTE — PROGRESS NOTES
Patient: CASPER DIEGO     Acct: [id number]     Report: #[id number]  UNIT #: [id number]     : 1970    Encounter Date:05/10/2019  PRIMARY CARE: VIKASH PASCAL  ***Signed***  --------------------------------------------------------------------------------------------------------------------  Chief Complaint      Encounter Date      May 10, 2019            Primary Care Provider      VIKASH PASCAL            Referring Provider      VIKASH PASCAL            Patient Complaint      Patient is complaining of      New pt here for abnormal CT            VITALS      Height 5 ft 7 in / 170.18 cm      Weight 168 lbs 7 oz / 76.71898 kg      BSA 1.88 m2      BMI 26.4 kg/m2      Temperature 97.8 F / 36.56 C - Oral      Pulse 84      Respirations 14      Blood Pressure 117/76 Sitting, Right Arm      Pulse Oximetry 100%, Room air            HPI      The patient is a very pleasant 48 year old  female never smoker here     for abnormal chest CT.  The patient was having some nonspecific symptoms a month    ago including substernal chest pain, sharp, intermittent, 4/10 in severity,     worse with cough and deep breathing, and relieved with rest.  She also had a dry    cough at that time.  She underwent a chest CT with contrast which showed no     pulmonary embolism and no lung parenchymal issues, however did show some     prominent mediastinal and hilar lymph nodes, the largest being 1.3 cm.  She also    had anterior mediastinal fullness consistent with residual thymus. She denies     any recent dyspnea, wheezing or hemoptysis.  She has an intermittent dry cough     with no sputum production occurring about 2-3 days a week and worse with her     allergies. She takes OTC allergy agents.  She denies any recent itchy-scratchy     throat, watery eyes or nasal congestion.  She denies any weight loss,     hemoptysis, fatigue or poor appetite.  Denies any nausea, vomiting, fevers,     chills,  headaches, chest pain or hemoptysis.  She is able to perform her ADLs     without difficulty.  Denies any swollen glands or lymph nodes of the head and     neck.            I have personally reviewed the review of systems, past family, social, surgical     and medical histories and I agree with the findings.            ROS      Constitutional:  Denies: Fatigue, Fever, Weight gain, Weight loss, Chills,     Insomnia, Other      Respiratory/Breathing:  Complains of: Cough; Denies: Shortness of air, Wheezing,    Hemoptysis, Pleuritic pain, Other      Endocrine:  Denies: Polydipsia, Polyuria, Heat/cold intolerance, Abnorml     menstrual pattern, Diabetes, Other      Eyes:  Denies: Blurred vision, Vision Changes, Other      Ears, nose, mouth, throat:  Denies: Mouth lesions, Thrush, Throat pain,     Hoarseness, Allergies/Hay Fever, Post Nasal Drip, Headaches, Recent Head Injury,    Nose Bleeding, Neck Stiffness, Thyroid Mass, Hearing Loss, Ear Fullness, Dry     Mouth, Nasal or Sinus Pain, Dry Lips, Nasal discharge, Nasal congestion, Other      Cardiovascular:  Denies: Palpitations, Syncope, Claudication, Chest Pain, Wake     up Gasping for air, Leg Swelling, Irregular Heart Rate, Cyanosis, Dyspnea on     Exertion, Other      Gastrointestinal:  Denies: Nausea, Constipation, Diarrhea, Abdominal pain,     Vomiting, Difficulty Swallowing, Reflux/Heartburn, Dysphagia, Jaundice,     Bloating, Melena, Bloody stools, Other      Genitourinary:  Denies: Urinary frequency, Incontinence, Hematuria, Urgency,     Nocturia, Dysuria, Testicular problems, Other      Musculoskeletal:  Denies: Joint Pain, Joint Stiffness, Joint Swelling, Myalgias,    Other      Hematologic/lymphatic:  DENIES: Lymphadenopathy, Bruising, Bleeding tendencies,     Other      Neurological:  Denies: Headache, Numbness, Weakness, Seizures, Other      Psychiatric:  Denies: Anxiety, Appropriate Effect, Depression, Other      Sleep:  No: Excessive daytime sleep,  Morning Headache?, Snoring, Insomnia?, Stop    breathing at sleep?, Other      Integumentary:  Denies: Rash, Dry skin, Skin Warm to Touch, Other      Immunologic/Allergic:  Denies: Latex allergy, Seasonal allergies, Asthma,     Urticaria, Eczema, Other      Immunization status:  No: Up to date            FAMILY/SOCIAL/MEDICAL HX      Surgical History:  Yes: Abdominal Surgery ( LAP. 2000), Back Surgery (fusion),     Orthopedic Surgery (right wrist), Other Surgeries (Acid reflux surg); No:     Appendectomy, Bladder Surgery, Bowel Surgery, CABG, Cholecystectomy, Head     Surgery, Oral Surgery, Vascular Surgery      Cancer/Type - Family Hx:  Father, Sister      Other Family Medical History:  Mother      Is Father Still Living?:  No      Is Mother Still Living?:  No      Smoking status:  Never smoker      Hysterectomy:  Yes      Anticoagulation Therapy:  No      Antibiotic Prophylaxis:  No      Medical History:  Yes: Allergies, Arthritis (RHEUMATOID), Hemorrhoids/Rectal     Prob (HAD GASTRIC SURGERY), High Blood Pressure, High Cholesterol, Reflux     Disease; No: Asthma, Blood Disease, Chemotherapy/Cancer, Congestive Heart Failu,    Deafness or Ringing Ears, Diabetes, Seizures, Heart Attack, Shortness Of Breath,    Miscellaneous Medical/oth            PREVENTION      Hx Influenza Vaccination:  Yes      Date Influenza Vaccine Given:  Oct 1, 2018      Influenza Vaccine Declined:  No      2 or More Falls Past Year?:  No      Fall Past Year with Injury?:  No      Hx Pneumococcal Vaccination:  No      Encouraged to follow-up with:  PCP regarding preventative exams.      Chart initiated by      Brina Alcala MA            ALLERGIES/MEDICATIONS      Allergies:        Coded Allergies:             NO KNOWN DRUG ALLERGIES (Verified  Allergy, Unknown, 5/10/19)      Medications    Last Reconciled on 5/10/19 08:38 by JAJA TATE MD      (L-citrulline)   No Conflict Check               Reported         5/10/19       Lisinopril*  (Lisinopril*) 5 Mg Tablet      5 MG PO QDAY, #30 TAB 0 Refills         Reported         5/10/19       Loratadine (Claritin) 10 Mg Tablet      10 MG PO HS, #30 TAB 0 Refills         Reported         5/10/19       Montelukast Sodium (Singulair*) 10 Mg Tablet      10 MG PO QDAY, #30 TAB 0 Refills         Reported         5/10/19       Diclofenac Sodium (Voltaren 1%*) 100 Gm Gel..gram.      1 APL TOPICAL TID, #1 TUBE         Prov: SattaCarla hernandezmil cfr         8/27/18       Meloxicam (Mobic*) 15 Mg Tablet      15 MG PO HS, #30 TAB         Prov: Sattaur,Zamil cfr         8/27/18      Current Medications      Current Medications Reviewed 5/10/19            EXAM      Vital Signs Reviewed.      General:  WDWN, Alert, NAD.      HEENT: PERRL, EOMI.  OP, nares clear, no sinus tenderness.      Neck: Supple, no JVD, no thyromegaly.      Lymph: No axillary, cervical, supraclavicular lymphadenopathy noted bilaterally.      Chest: Good aeration, clear to auscultation bilaterally, tympanic to percussion     bilaterally, no work of breathing noted.      CV: RRR, no MGR, pulses 2+, equal.        Abd: Soft, NT, ND, +BS, no HSM.      EXT: No clubbing, no cyanosis, no edema, no joint tenderness.        Neuro:  A  Skin: No rashes or lesions.      Vtials      Vitals:             Height 5 ft 7 in / 170.18 cm           Weight 168 lbs 7 oz / 76.88796 kg           BSA 1.88 m2           BMI 26.4 kg/m2           Temperature 97.8 F / 36.56 C - Oral           Pulse 84           Respirations 14           Blood Pressure 117/76 Sitting, Right Arm           Pulse Oximetry 100%, Room air            REVIEW      Results Reviewed      PCCS Results Reviewed?:  Yes Prev Lab Results, Yes Prev Radiology Results, Yes     Previous [facility] Records      Lab Results      I reviewed office notes from her referring provider. I personally reviewed the     patient's chest x-ray and chest CT with contrast done this year.  I personally     reviewed labs from 02/2019 showing  320 peripheral eosinophils and no evidence of    chronic hypercapnic respiratory failure.      Radiographic Results               [facility]                PACS RADIOLOGY REPORT            Patient: CASPER DIEGO   Acct: #[id number]   Report: #[id number]            UNIT #: [id number]    DOS: 18 1252      INSURANCE:[employer]   ORDER #:[id number]      LOCATION:ER     : 1970            PROVIDERS      ADMITTING:     ATTENDING:       FAMILY:  VIKASH PASCAL   ORDERING:  KALI MCELROY         OTHER:    DICTATING:  Bakari Lloyd MD, IV            REQ #:[id number]   EXAM:CXR2 - CHEST 2V AP PA LAT      REASON FOR EXAM:  Shortness of Breath      REASON FOR VISIT:  SOA            *******Signed******         PROCEDURE:   CHEST AP/PA AND LATERAL             COMPARISON:   [facility], CR, CHEST PA/AP   9:11.  [facility], CR, RIBS RIGHT, 10/20/2017, 9:14.             INDICATIONS:   RIGHT SIDE AND LATERAL CHEST/RIB PAIN X 1 WEEK. PAIN WITH     RESPIRATIONS. COUGH X 1       MONTH             FINDINGS:                The lungs are well-expanded. The heart and pulmonary vasculature are within     normal limits. No       pleural effusions are identified. There are no active appearing infiltrates.  No    evidence of       pneumothorax.              IMPRESSION: No active disease.              BAKARI LLOYD MD             Electronically Signed and Approved By: BAKARI LLOYD MD on 2018 at 13:21                           Until signed, this is an unconfirmed preliminary report that may contain      errors and is subject to change.                                              JARENJE:      D:18 1321               [facility]                PACS RADIOLOGY REPORT            Patient: CASPER DIEGO   Acct: #[id number]   Report: #[id number]             UNIT #: [id number]    DOS: 19 0945      INSURANCE:[employer]   ORDER #:[id number]      LOCATION:[facility]     : 1970            PROVIDERS      ADMITTING:     ATTENDING: VIKASH PASCAL      FAMILY:  NONE,MD   ORDERING:  VIKASH PASCAL         OTHER:    DICTATING:  Liborio Jenkins MD            REQ #:[id number]   EXAM:CHW - CT CHEST with CONTRAST      REASON FOR EXAM:        REASON FOR VISIT:  CHEST PAIN/MEMORY LOSS            *******Signed******         PROCEDURE:   CT CHEST W/ CONTRAST             COMPARISON:   None.             INDICATIONS:   CHEST PAIN, PALPATATIONS             TECHNIQUE:   After obtaining the patient's consent, CT images were obtained with    non-ionic       intravenous contrast material.               PROTOCOL:     Standard imaging protocol performed                RADIATION:     DLP: 552mGy*cm          Automated exposure control was utilized to minimize radiation dose.       CONTRAST:   95cc Isovue 370 I.V.      LABS:     eGFR: 83ml/min/1.73m2             FINDINGS:         There is some soft tissue attenuation within the hilar areas that could reflect     some nonspecific       lymph nodes.  There also is some increased attenuation to the fat anterior     mediastinum which could       reflect some thymic hyperplasia or small lymph nodes within this area.               Evaluation of lung windows reveal some ground-glass changes along the posterior     pleural margin in       the lower lobes that may reflect some dependent changes or possibly some     underlying atelectasis.        There is no moy pulmonary consolidation.  There are no pleural effusions.      There is some       atelectasis in the right middle lobe.             Lower slices through the upper abdomen reveal no discrete abnormality.             CONCLUSION:         1. There are some ground-glass changes along the posterior pleural margins in     the lower lobes that       could reflect  some dependent changes or possibly some atelectasis.  There is     some atelectasis in       the right middle lobe.      2. Some small nonspecific hilar lymph nodes are noted.  Additionally there is s    ome soft tissue       attenuation within the anterior mediastinum that may relate to thymic     hyperplasia or some       nonspecific lymph nodes within this area.              NINA REES MD             Electronically Signed and Approved By: NINA REES MD on 3/19/2019 at 14:17                        Until signed, this is an unconfirmed preliminary report that may contain      errors and is subject to change.                                              KAMCR:      D:03/19/19 1417            Assessment      Mediastinal lymphadenopathy - R59.0            Notes      New Medications      * MONTELUKAST SODIUM (Singulair*) 10 MG TABLET: 10 MG PO QDAY #30      * Loratadine (Claritin) 10 MG TABLET: 10 MG PO HS #30      * Lisinopril* 5 MG TABLET: 5 MG PO QDAY #30      * (L-citrulline):       Discontinued Medications      * Methylprednisolone (Medrol Dosepak) 4 MG TAB.DS.PK: 4 MG PO ASDIR #1         Instructions: Take dosepack as directed. Take all of first day's tablets the        first day.      New Diagnostics      * Chest W/O Cont CT, 3 Months         Dx: Mediastinal lymphadenopathy - R59.0      IMPRESSION:      1.  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      2.  Mild mediastinal lymphadenopathy.      3. Seasonal allergies, well-controlled.      4. Acute cough.               PLAN:      1.  Reviewed chest CT in detail with the patient.  Lymph nodes are mildly     prominent and mildly enlarged.  There is no other evidence of malignancy.  This     also appears to have a residual thymus. At this point, I discussed watchful     waiting with chest CTs versus performing bronchoscopy with fine needle     aspiration of lung nodules.  Given the differential diagnosis favor is no    nmalignant etiology  including granulomatous lung disease at this time, she will     like to do watchful waiting which is a reasonable approach.      2.  We will check noncontrast chest CT in three months.      3. Continue Singulair and Claritin.  If still coughing by next visit, would     consider stopping lisinopril as she could have an ACE inhibitor induced cough.      4.  She is up-to-date with flu vaccine with no indication for Prevnar or     Pneumovax.      5. I will have patient follow up with me in 3 months with chest CT.            Patient Education      Patient Education Provided:  Bronchoscopy                 Disclaimer: Converted document may not contain table formatting or lab diagrams. Please see [employer] for the authenticated document.

## 2021-06-28 ENCOUNTER — TRANSCRIBE ORDERS (OUTPATIENT)
Dept: LAB | Facility: HOSPITAL | Age: 51
End: 2021-06-28

## 2021-06-28 ENCOUNTER — LAB (OUTPATIENT)
Dept: LAB | Facility: HOSPITAL | Age: 51
End: 2021-06-28

## 2021-06-28 DIAGNOSIS — E55.9 MILD VITAMIN D DEFICIENCY: ICD-10-CM

## 2021-06-28 DIAGNOSIS — Z79.899 ENCOUNTER FOR LONG-TERM (CURRENT) USE OF OTHER MEDICATIONS: ICD-10-CM

## 2021-06-28 DIAGNOSIS — M05.79 RHEUMATOID ARTHRITIS, SEROPOSITIVE, MULTIPLE SITES (HCC): ICD-10-CM

## 2021-06-28 DIAGNOSIS — M13.0 CHRONIC POLYARTHRITIS: ICD-10-CM

## 2021-06-28 DIAGNOSIS — M05.79 RHEUMATOID ARTHRITIS, SEROPOSITIVE, MULTIPLE SITES (HCC): Primary | ICD-10-CM

## 2021-06-28 LAB
25(OH)D3 SERPL-MCNC: 41.2 NG/ML (ref 30–100)
ALT SERPL W P-5'-P-CCNC: 21 U/L (ref 1–33)
AST SERPL-CCNC: 24 U/L (ref 1–32)
BASOPHILS # BLD AUTO: 0.09 10*3/MM3 (ref 0–0.2)
BASOPHILS NFR BLD AUTO: 1.4 % (ref 0–1.5)
BUN SERPL-MCNC: 18 MG/DL (ref 6–20)
CREAT SERPL-MCNC: 1.06 MG/DL (ref 0.57–1)
DEPRECATED RDW RBC AUTO: 42.8 FL (ref 37–54)
EOSINOPHIL # BLD AUTO: 0.27 10*3/MM3 (ref 0–0.4)
EOSINOPHIL NFR BLD AUTO: 4.1 % (ref 0.3–6.2)
ERYTHROCYTE [DISTWIDTH] IN BLOOD BY AUTOMATED COUNT: 13.2 % (ref 12.3–15.4)
ERYTHROCYTE [SEDIMENTATION RATE] IN BLOOD: 4 MM/HR (ref 0–30)
GFR SERPL CREATININE-BSD FRML MDRD: 54 ML/MIN/1.73
HCT VFR BLD AUTO: 38.2 % (ref 34–46.6)
HGB BLD-MCNC: 12.7 G/DL (ref 12–15.9)
IMM GRANULOCYTES # BLD AUTO: 0.01 10*3/MM3 (ref 0–0.05)
IMM GRANULOCYTES NFR BLD AUTO: 0.2 % (ref 0–0.5)
LYMPHOCYTES # BLD AUTO: 2.53 10*3/MM3 (ref 0.7–3.1)
LYMPHOCYTES NFR BLD AUTO: 38.6 % (ref 19.6–45.3)
MCH RBC QN AUTO: 29.3 PG (ref 26.6–33)
MCHC RBC AUTO-ENTMCNC: 33.2 G/DL (ref 31.5–35.7)
MCV RBC AUTO: 88.2 FL (ref 79–97)
MONOCYTES # BLD AUTO: 0.64 10*3/MM3 (ref 0.1–0.9)
MONOCYTES NFR BLD AUTO: 9.8 % (ref 5–12)
NEUTROPHILS NFR BLD AUTO: 3.01 10*3/MM3 (ref 1.7–7)
NEUTROPHILS NFR BLD AUTO: 45.9 % (ref 42.7–76)
NRBC BLD AUTO-RTO: 0 /100 WBC (ref 0–0.2)
PLATELET # BLD AUTO: 257 10*3/MM3 (ref 140–450)
PMV BLD AUTO: 10.5 FL (ref 6–12)
RBC # BLD AUTO: 4.33 10*6/MM3 (ref 3.77–5.28)
WBC # BLD AUTO: 6.55 10*3/MM3 (ref 3.4–10.8)

## 2021-06-28 PROCEDURE — 84460 ALANINE AMINO (ALT) (SGPT): CPT

## 2021-06-28 PROCEDURE — 84450 TRANSFERASE (AST) (SGOT): CPT

## 2021-06-28 PROCEDURE — 85652 RBC SED RATE AUTOMATED: CPT

## 2021-06-28 PROCEDURE — 82565 ASSAY OF CREATININE: CPT

## 2021-06-28 PROCEDURE — 82306 VITAMIN D 25 HYDROXY: CPT

## 2021-06-28 PROCEDURE — 84520 ASSAY OF UREA NITROGEN: CPT

## 2021-06-28 PROCEDURE — 36415 COLL VENOUS BLD VENIPUNCTURE: CPT

## 2021-06-28 PROCEDURE — 85025 COMPLETE CBC W/AUTO DIFF WBC: CPT

## 2021-07-14 ENCOUNTER — TRANSCRIBE ORDERS (OUTPATIENT)
Dept: ADMINISTRATIVE | Facility: HOSPITAL | Age: 51
End: 2021-07-14

## 2021-07-14 DIAGNOSIS — M25.60 LIMITED JOINT RANGE OF MOTION: ICD-10-CM

## 2021-07-14 DIAGNOSIS — M25.552 LEFT HIP PAIN: Primary | ICD-10-CM

## 2021-08-23 ENCOUNTER — APPOINTMENT (OUTPATIENT)
Dept: MRI IMAGING | Facility: HOSPITAL | Age: 51
End: 2021-08-23

## 2021-09-15 ENCOUNTER — OFFICE VISIT (OUTPATIENT)
Dept: FAMILY MEDICINE CLINIC | Facility: CLINIC | Age: 51
End: 2021-09-15

## 2021-09-15 VITALS
HEIGHT: 67 IN | DIASTOLIC BLOOD PRESSURE: 86 MMHG | BODY MASS INDEX: 26.68 KG/M2 | WEIGHT: 170 LBS | HEART RATE: 83 BPM | TEMPERATURE: 97.5 F | SYSTOLIC BLOOD PRESSURE: 136 MMHG

## 2021-09-15 DIAGNOSIS — M79.672 LEFT FOOT PAIN: Primary | ICD-10-CM

## 2021-09-15 PROBLEM — I10 HYPERTENSION: Status: ACTIVE | Noted: 2021-09-15

## 2021-09-15 PROBLEM — Z98.1 S/P LUMBAR FUSION: Status: ACTIVE | Noted: 2017-02-15

## 2021-09-15 PROBLEM — J30.9 ALLERGIC RHINITIS: Status: ACTIVE | Noted: 2021-09-15

## 2021-09-15 PROBLEM — K21.9 GERD (GASTROESOPHAGEAL REFLUX DISEASE): Status: ACTIVE | Noted: 2021-09-15

## 2021-09-15 PROCEDURE — 99213 OFFICE O/P EST LOW 20 MIN: CPT | Performed by: FAMILY MEDICINE

## 2021-09-15 RX ORDER — CYCLOBENZAPRINE HCL 10 MG
1 TABLET ORAL DAILY
COMMUNITY

## 2021-09-15 RX ORDER — DULOXETIN HYDROCHLORIDE 30 MG/1
1 CAPSULE, DELAYED RELEASE ORAL DAILY
COMMUNITY
End: 2022-04-06

## 2021-09-15 RX ORDER — PREDNISONE 20 MG/1
40 TABLET ORAL DAILY
Qty: 20 TABLET | Refills: 0 | Status: SHIPPED | OUTPATIENT
Start: 2021-09-15 | End: 2021-09-25

## 2021-09-15 RX ORDER — ADALIMUMAB 40MG/0.8ML
0.8 KIT SUBCUTANEOUS
COMMUNITY
End: 2022-04-06

## 2021-09-15 NOTE — PROGRESS NOTES
"Chief Complaint    Toe Pain (JOINTS / BOTH FEET)    Subjective      Nora Walker presents to [facility]     History of Present Illness    1.) FOOT PAIN : Onset - > 1 week. History of RA. Treated with Humira. Patient reports bilateral foot pain. Recent onset of left foot pain. History of similar pain of her right foot, where she was eventually evaluated by podiatry and diagnosed with a dislocation of at least one of her metatarsals. Eventually underwent surgery. Presents today reporting that she is experiencing similar pains of her left foot. No recent injury. Worsens with long periods of standing. Patient works at a [employer].    Objective      Vital Signs:     /86   Pulse 83   Temp 97.5 °F (36.4 °C)   Ht 170.2 cm (67\")   Wt 77.1 kg (170 lb)   BMI 26.63 kg/m²       Physical Exam  Vitals reviewed.   Constitutional:       General: She is not in acute distress.     Appearance: Normal appearance. She is well-developed.   HENT:      Head: Normocephalic and atraumatic.      Right Ear: Hearing and external ear normal.      Left Ear: Hearing and external ear normal.      Nose: Nose normal.   Eyes:      General: Lids are normal.         Right eye: No discharge.         Left eye: No discharge.      Conjunctiva/sclera: Conjunctivae normal.   Cardiovascular:      Rate and Rhythm: Normal rate and regular rhythm.   Pulmonary:      Effort: Pulmonary effort is normal.   Abdominal:      General: There is no distension.   Musculoskeletal:      Cervical back: Neck supple.      Comments: Tender with palpation of both dorsal and plantar aspect of distal feet - approximately located between third and fourth metatarsal. Edema appreciated of plantar aspect of bilateral feet located at distal aspect between digits discussed above.   Skin:     Coloration: Skin is not jaundiced.      Findings: No erythema.   Neurological:      Mental Status: She is alert. Mental status is at baseline. "   Psychiatric:         Mood and Affect: Mood and affect normal.         Thought Content: Thought content normal.     Assessment and Plan    Diagnoses and all orders for this visit:    1. Left foot pain (Primary)  -     XR Foot 3+ View Left    Other orders  -     predniSONE (DELTASONE) 20 MG tablet; Take 2 tablets by mouth Daily for 10 days.  Dispense: 20 tablet; Refill: 0    · X-ray here in office shows some arthritic changes - no dislocation appreciated. Course of PO steroids as noted. Advised to follow up with rheumatology. If no relief, contact office and will refer back to podiatry.    Follow Up     Return As needed.     Patient was given instructions and counseling regarding her condition or for health maintenance advice. Please see specific information pulled into the AVS if appropriate.

## 2021-11-30 DIAGNOSIS — M79.671 PAIN IN BOTH FEET: Primary | ICD-10-CM

## 2021-11-30 DIAGNOSIS — M79.672 PAIN IN BOTH FEET: Primary | ICD-10-CM

## 2021-12-02 ENCOUNTER — TRANSCRIBE ORDERS (OUTPATIENT)
Dept: ADMINISTRATIVE | Facility: HOSPITAL | Age: 51
End: 2021-12-02

## 2021-12-02 DIAGNOSIS — T14.8XXA LIGAMENT TEAR: Primary | ICD-10-CM

## 2021-12-03 RX ORDER — LORATADINE 10 MG/1
TABLET ORAL
Qty: 90 TABLET | Refills: 0 | Status: SHIPPED | OUTPATIENT
Start: 2021-12-03 | End: 2022-12-17

## 2021-12-23 ENCOUNTER — HOSPITAL ENCOUNTER (OUTPATIENT)
Dept: MRI IMAGING | Facility: HOSPITAL | Age: 51
Discharge: HOME | End: 2021-12-23
Admitting: PODIATRIST

## 2021-12-23 DIAGNOSIS — T14.8XXA LIGAMENT TEAR: ICD-10-CM

## 2021-12-23 PROCEDURE — 73718 MRI LOWER EXTREMITY W/O DYE: CPT

## 2022-04-06 ENCOUNTER — HOSPITAL ENCOUNTER (OUTPATIENT)
Dept: CT IMAGING | Facility: HOSPITAL | Age: 52
Discharge: HOME | End: 2022-04-06
Admitting: NURSE PRACTITIONER

## 2022-04-06 ENCOUNTER — OFFICE VISIT (OUTPATIENT)
Dept: FAMILY MEDICINE CLINIC | Facility: CLINIC | Age: 52
End: 2022-04-06

## 2022-04-06 VITALS
OXYGEN SATURATION: 99 % | TEMPERATURE: 97.5 F | HEIGHT: 67 IN | HEART RATE: 94 BPM | DIASTOLIC BLOOD PRESSURE: 100 MMHG | SYSTOLIC BLOOD PRESSURE: 158 MMHG | WEIGHT: 168 LBS | BODY MASS INDEX: 26.37 KG/M2

## 2022-04-06 DIAGNOSIS — R94.31 ABNORMAL EKG: ICD-10-CM

## 2022-04-06 DIAGNOSIS — L81.9 MOTTLED SKIN: ICD-10-CM

## 2022-04-06 DIAGNOSIS — D64.9 ANEMIA, UNSPECIFIED TYPE: ICD-10-CM

## 2022-04-06 DIAGNOSIS — R06.02 SOB (SHORTNESS OF BREATH): ICD-10-CM

## 2022-04-06 DIAGNOSIS — U07.1 COVID-19: Primary | ICD-10-CM

## 2022-04-06 LAB
ALBUMIN SERPL-MCNC: 4.3 G/DL (ref 3.5–5.2)
ALBUMIN/GLOB SERPL: 1.2 G/DL
ALP SERPL-CCNC: 90 U/L (ref 39–117)
ALT SERPL W P-5'-P-CCNC: 20 U/L (ref 1–33)
ANION GAP SERPL CALCULATED.3IONS-SCNC: 10.8 MMOL/L (ref 5–15)
AST SERPL-CCNC: 26 U/L (ref 1–32)
BASOPHILS # BLD AUTO: 0.02 10*3/MM3 (ref 0–0.2)
BASOPHILS NFR BLD AUTO: 0.4 % (ref 0–1.5)
BILIRUB SERPL-MCNC: 0.3 MG/DL (ref 0–1.2)
BUN SERPL-MCNC: 11 MG/DL (ref 6–20)
BUN/CREAT SERPL: 13.4 (ref 7–25)
CALCIUM SPEC-SCNC: 9.6 MG/DL (ref 8.6–10.5)
CHLORIDE SERPL-SCNC: 103 MMOL/L (ref 98–107)
CO2 SERPL-SCNC: 26.2 MMOL/L (ref 22–29)
CREAT SERPL-MCNC: 0.82 MG/DL (ref 0.57–1)
DEPRECATED RDW RBC AUTO: 41.1 FL (ref 37–54)
EGFRCR SERPLBLD CKD-EPI 2021: 86.2 ML/MIN/1.73
EOSINOPHIL # BLD AUTO: 0.12 10*3/MM3 (ref 0–0.4)
EOSINOPHIL NFR BLD AUTO: 2.2 % (ref 0.3–6.2)
ERYTHROCYTE [DISTWIDTH] IN BLOOD BY AUTOMATED COUNT: 12.9 % (ref 12.3–15.4)
FERRITIN SERPL-MCNC: 271 NG/ML (ref 13–150)
GLOBULIN UR ELPH-MCNC: 3.6 GM/DL
GLUCOSE SERPL-MCNC: 99 MG/DL (ref 65–99)
HCT VFR BLD AUTO: 41.8 % (ref 34–46.6)
HGB BLD-MCNC: 13.3 G/DL (ref 12–15.9)
IMM GRANULOCYTES # BLD AUTO: 0.01 10*3/MM3 (ref 0–0.05)
IMM GRANULOCYTES NFR BLD AUTO: 0.2 % (ref 0–0.5)
IRON 24H UR-MRATE: 75 MCG/DL (ref 37–145)
IRON SATN MFR SERPL: 23 % (ref 20–50)
LYMPHOCYTES # BLD AUTO: 1.4 10*3/MM3 (ref 0.7–3.1)
LYMPHOCYTES NFR BLD AUTO: 25.5 % (ref 19.6–45.3)
MAGNESIUM SERPL-MCNC: 2.2 MG/DL (ref 1.6–2.6)
MCH RBC QN AUTO: 27.8 PG (ref 26.6–33)
MCHC RBC AUTO-ENTMCNC: 31.8 G/DL (ref 31.5–35.7)
MCV RBC AUTO: 87.3 FL (ref 79–97)
MONOCYTES # BLD AUTO: 0.53 10*3/MM3 (ref 0.1–0.9)
MONOCYTES NFR BLD AUTO: 9.7 % (ref 5–12)
NEUTROPHILS NFR BLD AUTO: 3.4 10*3/MM3 (ref 1.7–7)
NEUTROPHILS NFR BLD AUTO: 62 % (ref 42.7–76)
NRBC BLD AUTO-RTO: 0 /100 WBC (ref 0–0.2)
PLATELET # BLD AUTO: 216 10*3/MM3 (ref 140–450)
PMV BLD AUTO: 10.9 FL (ref 6–12)
POTASSIUM SERPL-SCNC: 4.4 MMOL/L (ref 3.5–5.2)
PROT SERPL-MCNC: 7.9 G/DL (ref 6–8.5)
RBC # BLD AUTO: 4.79 10*6/MM3 (ref 3.77–5.28)
SODIUM SERPL-SCNC: 140 MMOL/L (ref 136–145)
TIBC SERPL-MCNC: 322 MCG/DL (ref 298–536)
TRANSFERRIN SERPL-MCNC: 216 MG/DL (ref 200–360)
WBC NRBC COR # BLD: 5.48 10*3/MM3 (ref 3.4–10.8)

## 2022-04-06 PROCEDURE — 82728 ASSAY OF FERRITIN: CPT | Performed by: NURSE PRACTITIONER

## 2022-04-06 PROCEDURE — 71260 CT THORAX DX C+: CPT

## 2022-04-06 PROCEDURE — 0 IOPAMIDOL PER 1 ML: Performed by: NURSE PRACTITIONER

## 2022-04-06 PROCEDURE — 36415 COLL VENOUS BLD VENIPUNCTURE: CPT | Performed by: NURSE PRACTITIONER

## 2022-04-06 PROCEDURE — 83540 ASSAY OF IRON: CPT | Performed by: NURSE PRACTITIONER

## 2022-04-06 PROCEDURE — 83735 ASSAY OF MAGNESIUM: CPT | Performed by: NURSE PRACTITIONER

## 2022-04-06 PROCEDURE — 84466 ASSAY OF TRANSFERRIN: CPT | Performed by: NURSE PRACTITIONER

## 2022-04-06 PROCEDURE — 85025 COMPLETE CBC W/AUTO DIFF WBC: CPT | Performed by: NURSE PRACTITIONER

## 2022-04-06 PROCEDURE — 93000 ELECTROCARDIOGRAM COMPLETE: CPT | Performed by: NURSE PRACTITIONER

## 2022-04-06 PROCEDURE — 99214 OFFICE O/P EST MOD 30 MIN: CPT | Performed by: NURSE PRACTITIONER

## 2022-04-06 PROCEDURE — 80053 COMPREHEN METABOLIC PANEL: CPT | Performed by: NURSE PRACTITIONER

## 2022-04-06 RX ORDER — DEXAMETHASONE 4 MG/1
4 TABLET ORAL
Qty: 5 TABLET | Refills: 0 | Status: SHIPPED | OUTPATIENT
Start: 2022-04-06 | End: 2022-12-17

## 2022-04-06 RX ADMIN — IOPAMIDOL 100 ML: 755 INJECTION, SOLUTION INTRAVENOUS at 13:41

## 2022-04-06 NOTE — PROGRESS NOTES
Venipuncture Blood Specimen Collection  Venipuncture performed in left arm  by Danae Cee with good hemostasis. Patient tolerated the procedure well without complications.   04/06/22   Danae Cee

## 2022-04-06 NOTE — PROGRESS NOTES
Chief Complaint  Fatigue (Tested positive for covid at home on Tuesday last week), Headache (Severe headache yesterday), and Skin Discoloration (Skin is turning purple)    Subjective        Past Medical History:   Diagnosis Date   • Allergic rhinitis    • GERD (gastroesophageal reflux disease)    • Low back pain 2012   • Rheumatoid arthritis (HCC)      Social History     Tobacco Use   • Smoking status: Never Smoker   • Smokeless tobacco: Never Used   Vaping Use   • Vaping Use: Never used   Substance Use Topics   • Alcohol use: Never   • Drug use: Never      Current Outpatient Medications on File Prior to Visit   Medication Sig   • cyclobenzaprine (FLEXERIL) 10 MG tablet Take 1 tablet by mouth Daily.   • loratadine (CLARITIN) 10 MG tablet TAKE ONE TABLET BY MOUTH EVERY DAY   • [DISCONTINUED] Adalimumab (Humira Pen) 40 MG/0.8ML Pen-injector Kit Inject 0.8 mL under the skin into the appropriate area as directed Every 14 (Fourteen) Days.   • [DISCONTINUED] DULoxetine (CYMBALTA) 30 MG capsule Take 1 capsule by mouth Daily.     No current facility-administered medications on file prior to visit.      Allergies   Allergen Reactions   • Adhesive Tape Rash     RASH FROM SOME TAPES AFTER ON OVER 1 DAY      Health Maintenance Due   Topic Date Due   • MAMMOGRAM  Never done   • COLORECTAL CANCER SCREENING  Never done   • ANNUAL PHYSICAL  Never done   • COVID-19 Vaccine (1) Never done   • TDAP/TD VACCINES (1 - Tdap) Never done   • ZOSTER VACCINE (1 of 2) Never done   • HEPATITIS C SCREENING  Never done      Nora Walker presents to [facility]  Here with fatigue, headache and skin discoloration. Pt tested positive for Covid 1 week ago. Pt states she went back to work yesterday and developed a severe headache to the posterior head and neck and states that she took a muscle relaxer and it helped some. Pt states she is also having mottled appearance of skin with activity. Pt does note mild SOB.  "Pt has hx of RA. Denies CP but chest feels tight. BP elevated in office at 158/100. Denies numbness or tingling of the hands or feet.       Objective   Vital Signs:   /100 (BP Location: Left arm)   Pulse 94   Temp 97.5 °F (36.4 °C)   Ht 170.2 cm (67\")   Wt 76.2 kg (168 lb)   SpO2 99%   BMI 26.31 kg/m²     Review of Systems   Physical Exam  Vitals reviewed.   Constitutional:       General: She is not in acute distress.     Appearance: Normal appearance. She is well-developed.   HENT:      Head: Normocephalic and atraumatic.   Eyes:      Conjunctiva/sclera: Conjunctivae normal.      Pupils: Pupils are equal, round, and reactive to light.   Cardiovascular:      Rate and Rhythm: Normal rate and regular rhythm.      Heart sounds: Normal heart sounds.   Pulmonary:      Effort: Pulmonary effort is normal.      Breath sounds: Normal breath sounds.   Musculoskeletal:      Cervical back: Neck supple.      Right lower leg: No edema.      Left lower leg: No edema.   Skin:     General: Skin is warm and dry.   Neurological:      Mental Status: She is alert and oriented to person, place, and time.   Psychiatric:         Mood and Affect: Mood and affect normal.         Behavior: Behavior normal.         Thought Content: Thought content normal.         Judgment: Judgment normal.        Result Review :   The following data was reviewed by: JUDE Mcdonough on 04/06/2022:    Data reviewed: Cardiology studies EKG     ECG 12 Lead    Date/Time: 4/6/2022 8:24 AM  Performed by: Joanie Gilmore APRN  Authorized by: Joanie Gilmore APRN   Comparison: not compared with previous ECG   Previous ECG: no previous ECG available  Rhythm: sinus rhythm  Ectopy: atrial premature contractions and bigeminy    Clinical impression: abnormal EKG              Assessment and Plan    Diagnoses and all orders for this visit:    1. COVID-19 (Primary)  -     ECG 12 Lead  -     CT Angiogram Chest With Contrast  -     dexamethasone (DECADRON) " 4 MG tablet; Take 1 tablet by mouth Daily With Breakfast.  Dispense: 5 tablet; Refill: 0    2. Abnormal EKG  -     Magnesium  -     Ambulatory Referral to Cardiology    3. Mottled skin  -     Comprehensive Metabolic Panel  -     CT Angiogram Chest With Contrast  -     Ambulatory Referral to Cardiology    4. Anemia, unspecified type  -     CBC & Differential  -     Ferritin  -     Iron Profile    5. SOB (shortness of breath)  -     ECG 12 Lead  -     CT Angiogram Chest With Contrast        Follow Up   Return if symptoms worsen or fail to improve.  Patient was given instructions and counseling regarding her condition or for health maintenance advice. Please see specific information pulled into the AVS if appropriate.

## 2022-04-11 DIAGNOSIS — R79.89 ELEVATED FERRITIN: Primary | ICD-10-CM

## 2022-04-21 ENCOUNTER — CONSULT (OUTPATIENT)
Dept: ONCOLOGY | Facility: HOSPITAL | Age: 52
End: 2022-04-21

## 2022-04-21 ENCOUNTER — LAB (OUTPATIENT)
Dept: ONCOLOGY | Facility: HOSPITAL | Age: 52
End: 2022-04-21

## 2022-04-21 VITALS
DIASTOLIC BLOOD PRESSURE: 77 MMHG | WEIGHT: 171.74 LBS | HEART RATE: 86 BPM | OXYGEN SATURATION: 98 % | BODY MASS INDEX: 26.9 KG/M2 | TEMPERATURE: 97.8 F | SYSTOLIC BLOOD PRESSURE: 134 MMHG | RESPIRATION RATE: 16 BRPM

## 2022-04-21 DIAGNOSIS — R79.89 ELEVATED FERRITIN: ICD-10-CM

## 2022-04-21 DIAGNOSIS — R79.89 ELEVATED FERRITIN: Primary | ICD-10-CM

## 2022-04-21 LAB
BASOPHILS # BLD AUTO: 0.05 10*3/MM3 (ref 0–0.2)
BASOPHILS NFR BLD AUTO: 0.6 % (ref 0–1.5)
DEPRECATED RDW RBC AUTO: 40.1 FL (ref 37–54)
EOSINOPHIL # BLD AUTO: 0.17 10*3/MM3 (ref 0–0.4)
EOSINOPHIL NFR BLD AUTO: 2.1 % (ref 0.3–6.2)
ERYTHROCYTE [DISTWIDTH] IN BLOOD BY AUTOMATED COUNT: 13.1 % (ref 12.3–15.4)
ERYTHROCYTE [SEDIMENTATION RATE] IN BLOOD: 24 MM/HR (ref 0–30)
FERRITIN SERPL-MCNC: 222.3 NG/ML (ref 13–150)
HCT VFR BLD AUTO: 36.4 % (ref 34–46.6)
HGB BLD-MCNC: 12.3 G/DL (ref 12–15.9)
IMM GRANULOCYTES # BLD AUTO: 0.02 10*3/MM3 (ref 0–0.05)
IMM GRANULOCYTES NFR BLD AUTO: 0.2 % (ref 0–0.5)
IRON 24H UR-MRATE: 91 MCG/DL (ref 37–145)
IRON SATN MFR SERPL: 30 % (ref 20–50)
LYMPHOCYTES # BLD AUTO: 1.51 10*3/MM3 (ref 0.7–3.1)
LYMPHOCYTES NFR BLD AUTO: 18.7 % (ref 19.6–45.3)
MCH RBC QN AUTO: 29.1 PG (ref 26.6–33)
MCHC RBC AUTO-ENTMCNC: 33.8 G/DL (ref 31.5–35.7)
MCV RBC AUTO: 86.3 FL (ref 79–97)
MONOCYTES # BLD AUTO: 0.67 10*3/MM3 (ref 0.1–0.9)
MONOCYTES NFR BLD AUTO: 8.3 % (ref 5–12)
NEUTROPHILS NFR BLD AUTO: 5.67 10*3/MM3 (ref 1.7–7)
NEUTROPHILS NFR BLD AUTO: 70.1 % (ref 42.7–76)
NRBC BLD AUTO-RTO: 0 /100 WBC (ref 0–0.2)
PLATELET # BLD AUTO: 268 10*3/MM3 (ref 140–450)
PMV BLD AUTO: 10 FL (ref 6–12)
RBC # BLD AUTO: 4.22 10*6/MM3 (ref 3.77–5.28)
TIBC SERPL-MCNC: 302 MCG/DL (ref 298–536)
TRANSFERRIN SERPL-MCNC: 203 MG/DL (ref 200–360)
WBC NRBC COR # BLD: 8.09 10*3/MM3 (ref 3.4–10.8)

## 2022-04-21 PROCEDURE — 82728 ASSAY OF FERRITIN: CPT | Performed by: NURSE PRACTITIONER

## 2022-04-21 PROCEDURE — 85025 COMPLETE CBC W/AUTO DIFF WBC: CPT | Performed by: NURSE PRACTITIONER

## 2022-04-21 PROCEDURE — 84466 ASSAY OF TRANSFERRIN: CPT | Performed by: NURSE PRACTITIONER

## 2022-04-21 PROCEDURE — 99204 OFFICE O/P NEW MOD 45 MIN: CPT | Performed by: NURSE PRACTITIONER

## 2022-04-21 PROCEDURE — 81256 HFE GENE: CPT | Performed by: NURSE PRACTITIONER

## 2022-04-21 PROCEDURE — 36415 COLL VENOUS BLD VENIPUNCTURE: CPT | Performed by: NURSE PRACTITIONER

## 2022-04-21 PROCEDURE — 85652 RBC SED RATE AUTOMATED: CPT

## 2022-04-21 PROCEDURE — 83540 ASSAY OF IRON: CPT | Performed by: NURSE PRACTITIONER

## 2022-04-21 PROCEDURE — G0463 HOSPITAL OUTPT CLINIC VISIT: HCPCS | Performed by: NURSE PRACTITIONER

## 2022-04-21 NOTE — PROGRESS NOTES
Chief Complaint  Elevated Ferritin    Zaid Renteria MD Smith, Robert Eugene, MD    Records Obtained:  Records of the patients history including those obtained from  [facility] were reviewed and summarized in detail.    Reason for referral: elevated ferritin    Subjective          Nora Walker presents to [facility] for elevated ferritin.     History of Present Illness   Ms. Nora Walker presents for new patient evaluation for elevated ferritin. She reports a history of RA for which she takes Humira for. Reports she had COVID infection last month. Denies any family members with genetic mutations associated with hemochromatosis. She denies smoking history.     Reports recent diagnosis of Raynaud's syndrome as well. Reports she has chronic joint pain as well. Stopped taking her Duloxtine and Mobic recently.     Recent lab work: Iron 75, ferritin elevated of 271, iron sat 23%, Hemoglobin 12.3, WBC 8.09, platelet count 268,000.       Oncology/Hematology History    No history exists.       Review of Systems   Constitutional: Positive for fatigue. Negative for appetite change, diaphoresis, fever, unexpected weight gain and unexpected weight loss.   HENT: Negative for hearing loss, sore throat and voice change.    Eyes: Negative for blurred vision, double vision, pain, redness and visual disturbance.   Respiratory: Negative for cough, shortness of breath and wheezing.    Cardiovascular: Positive for leg swelling (ble hands and feet). Negative for chest pain and palpitations.   Endocrine: Negative for cold intolerance, heat intolerance, polydipsia and polyuria.   Genitourinary: Negative for decreased urine volume, difficulty urinating, frequency and urinary incontinence.   Musculoskeletal: Negative for arthralgias, back pain, joint swelling and myalgias.   Skin: Negative for color change, rash, skin lesions and wound.   Neurological: Positive for numbness (hands - ble).  Negative for dizziness, seizures and headache.   Hematological: Negative for adenopathy. Does not bruise/bleed easily.   Psychiatric/Behavioral: Negative for depressed mood. The patient is not nervous/anxious.    All other systems reviewed and are negative.      Current Outpatient Medications on File Prior to Visit   Medication Sig Dispense Refill   • cyclobenzaprine (FLEXERIL) 10 MG tablet Take 1 tablet by mouth Daily.     • dexamethasone (DECADRON) 4 MG tablet Take 1 tablet by mouth Daily With Breakfast. 5 tablet 0   • loratadine (CLARITIN) 10 MG tablet TAKE ONE TABLET BY MOUTH EVERY DAY 90 tablet 0     No current facility-administered medications on file prior to visit.       Allergies   Allergen Reactions   • Adhesive Tape Rash     RASH FROM SOME TAPES AFTER ON OVER 1 DAY     Past Medical History:   Diagnosis Date   • Allergic rhinitis    • GERD (gastroesophageal reflux disease)    • Low back pain 2012   • Rheumatoid arthritis (HCC)      Past Surgical History:   Procedure Laterality Date   • BACK SURGERY      low back disc   • COLONOSCOPY  1998   • FRACTURE SURGERY  2021    R foot 2nd toe   • HYSTERECTOMY     • SPINE SURGERY  [id number]    Multi     Social History     Socioeconomic History   • Marital status:    Tobacco Use   • Smoking status: Never Smoker   • Smokeless tobacco: Never Used   Vaping Use   • Vaping Use: Never used   Substance and Sexual Activity   • Alcohol use: Never   • Drug use: Never   • Sexual activity: Not Currently     Partners: Male     Birth control/protection: Post-menopausal     Family History   Problem Relation Age of Onset   • Depression Mother    • Osteoporosis Mother    • Alcohol abuse Mother        There is no immunization history on file for this patient.    Objective   Physical Exam  Vitals and nursing note reviewed.   Constitutional:       Appearance: Normal appearance.   HENT:      Head: Normocephalic.      Nose: Nose normal.      Mouth/Throat:      Mouth: Mucous  membranes are moist.   Eyes:      Pupils: Pupils are equal, round, and reactive to light.   Cardiovascular:      Rate and Rhythm: Normal rate and regular rhythm.      Pulses: Normal pulses.      Heart sounds: Normal heart sounds.   Pulmonary:      Effort: Pulmonary effort is normal.      Breath sounds: Normal breath sounds.   Abdominal:      General: Bowel sounds are normal.      Palpations: Abdomen is soft.   Musculoskeletal:         General: Normal range of motion.      Cervical back: Normal range of motion and neck supple.   Skin:     General: Skin is warm and dry.      Capillary Refill: Capillary refill takes less than 2 seconds.   Neurological:      General: No focal deficit present.      Mental Status: She is oriented to person, place, and time.   Psychiatric:         Mood and Affect: Mood normal.         Behavior: Behavior normal.         Thought Content: Thought content normal.         Judgment: Judgment normal.         Vitals:    04/21/22 1349   BP: 134/77   Pulse: 86   Resp: 16   Temp: 97.8 °F (36.6 °C)   SpO2: 98%   Weight: 77.9 kg (171 lb 11.8 oz)   PainSc:   3   PainLoc: Generalized     ECOG score: 0         ECOG: (0) Fully Active - Able to Carry On All Pre-disease Performance Without Restriction  Fall Risk Assessment was completed, and patient is at low risk for falls.  PHQ-9 Total Score: 0       The patient is  experiencing fatigue. Fatigue score: 3    PT/OT Functional Screening: PT fx screen: No needs identified  Speech Functional Screening: Speech fx screen: No needs identified  Rehab to be ordered: Rehab to be ordered: No needs identified        Result Review :   The following data was reviewed by: JUDE Sagastume on 04/21/2022:  Lab Results   Component Value Date    HGB 12.3 04/21/2022    HCT 36.4 04/21/2022    MCV 86.3 04/21/2022     04/21/2022    WBC 8.09 04/21/2022    NEUTROABS 5.67 04/21/2022    LYMPHSABS 1.51 04/21/2022    MONOSABS 0.67 04/21/2022    EOSABS 0.17 04/21/2022     BASOSABS 0.05 04/21/2022     Lab Results   Component Value Date    GLUCOSE 99 04/06/2022    BUN 11 04/06/2022    CREATININE 0.82 04/06/2022     04/06/2022    K 4.4 04/06/2022     04/06/2022    CO2 26.2 04/06/2022    CALCIUM 9.6 04/06/2022    PROTEINTOT 7.9 04/06/2022    ALBUMIN 4.30 04/06/2022    BILITOT 0.3 04/06/2022    ALKPHOS 90 04/06/2022    AST 26 04/06/2022    ALT 20 04/06/2022          Assessment and Plan    Diagnoses and all orders for this visit:    1. Elevated ferritin (Primary)  -     CBC & Differential  -     Iron Profile  -     Ferritin  -     Hemochromatosis Mutation  -     Sedimentation Rate; Future    Likely elevated ferritin is reactive and  secondary to recent COVID infection as well as chronic inflammation associated with RA and other co-morbid illness.   We will recheck ferritin today as well as hemochromatosis gene mutation, but doubt there is any concern for this.     I spent 30 minutes caring for Nora on this date of service. This time includes time spent by me in the following activities:preparing for the visit, reviewing tests, obtaining and/or reviewing a separately obtained history, performing a medically appropriate examination and/or evaluation , counseling and educating the patient/family/caregiver, ordering medications, tests, or procedures, referring and communicating with other health care professionals , documenting information in the medical record, independently interpreting results and communicating that information with the patient/family/caregiver and care coordination    Patient Follow Up: 4-6 week for results of lab work.     Patient was given instructions and counseling regarding her condition or for health maintenance advice. Please see specific information pulled into the AVS if appropriate.     Chacha De La Garza, APRN    4/21/2022

## 2022-04-25 ENCOUNTER — OFFICE VISIT (OUTPATIENT)
Dept: FAMILY MEDICINE CLINIC | Facility: CLINIC | Age: 52
End: 2022-04-25

## 2022-04-25 VITALS
HEIGHT: 67 IN | TEMPERATURE: 98.4 F | BODY MASS INDEX: 27.47 KG/M2 | DIASTOLIC BLOOD PRESSURE: 90 MMHG | SYSTOLIC BLOOD PRESSURE: 142 MMHG | WEIGHT: 175 LBS | HEART RATE: 75 BPM | OXYGEN SATURATION: 100 %

## 2022-04-25 DIAGNOSIS — M25.531 RIGHT WRIST PAIN: Primary | ICD-10-CM

## 2022-04-25 DIAGNOSIS — M06.9 RHEUMATOID ARTHRITIS, INVOLVING UNSPECIFIED SITE, UNSPECIFIED WHETHER RHEUMATOID FACTOR PRESENT: Primary | ICD-10-CM

## 2022-04-25 LAB — HFE GENE MUT ANL BLD/T: NORMAL

## 2022-04-25 PROCEDURE — 99213 OFFICE O/P EST LOW 20 MIN: CPT | Performed by: FAMILY MEDICINE

## 2022-04-25 RX ORDER — DICLOFENAC SODIUM 75 MG/1
75 TABLET, DELAYED RELEASE ORAL 2 TIMES DAILY
Qty: 28 TABLET | Refills: 0 | Status: SHIPPED | OUTPATIENT
Start: 2022-04-25 | End: 2022-05-12 | Stop reason: SDUPTHER

## 2022-04-25 NOTE — PROGRESS NOTES
Chief Complaint  Wrist Pain (Right wrist pain and swelling x 6 weeks. No injury )    Subjective          Nora Walker presents to [facility]  Wrist Pain   The pain is present in the right wrist. This is a new problem. Episode onset: 6+ weeks. There has been no history of extremity trauma. The problem occurs constantly. The problem has been gradually worsening. The quality of the pain is described as aching. The patient is experiencing no pain. Associated symptoms include joint swelling and numbness. Pertinent negatives include no fever, inability to bear weight, itching, joint locking, limited range of motion, stiffness or tingling. The symptoms are aggravated by activity. She has tried nothing for the symptoms.       Objective   Allergies   Allergen Reactions   • Adhesive Tape Rash     RASH FROM SOME TAPES AFTER ON OVER 1 DAY       There is no immunization history on file for this patient.  Past Medical History:   Diagnosis Date   • Allergic rhinitis    • GERD (gastroesophageal reflux disease)    • Low back pain 2012   • Rheumatoid arthritis (HCC)       Past Surgical History:   Procedure Laterality Date   • BACK SURGERY      low back disc   • COLONOSCOPY  1998   • FRACTURE SURGERY  2021    R foot 2nd toe   • HYSTERECTOMY     • SPINE SURGERY  [id number]    Multi      Social History     Socioeconomic History   • Marital status:    Tobacco Use   • Smoking status: Never Smoker   • Smokeless tobacco: Never Used   Vaping Use   • Vaping Use: Never used   Substance and Sexual Activity   • Alcohol use: Never   • Drug use: Never   • Sexual activity: Not Currently     Partners: Male     Birth control/protection: Post-menopausal        Current Outpatient Medications:   •  cyclobenzaprine (FLEXERIL) 10 MG tablet, Take 1 tablet by mouth Daily., Disp: , Rfl:   •  loratadine (CLARITIN) 10 MG tablet, TAKE ONE TABLET BY MOUTH EVERY DAY, Disp: 90 tablet, Rfl: 0  •  dexamethasone (DECADRON)  "4 MG tablet, Take 1 tablet by mouth Daily With Breakfast., Disp: 5 tablet, Rfl: 0  •  diclofenac (VOLTAREN) 75 MG EC tablet, Take 1 tablet by mouth 2 (Two) Times a Day., Disp: 28 tablet, Rfl: 0   Family History   Problem Relation Age of Onset   • Depression Mother    • Osteoporosis Mother    • Alcohol abuse Mother           Vital Signs:   Vitals:    04/25/22 1031   BP: 142/90   Pulse: 75   Temp: 98.4 °F (36.9 °C)   SpO2: 100%   Weight: 79.4 kg (175 lb)   Height: 170.2 cm (67\")       Review of Systems   Constitutional: Negative for fever.   Musculoskeletal: Negative for stiffness.   Skin: Negative for itching.   Neurological: Positive for numbness. Negative for tingling.      Physical Exam  Vitals reviewed.   Constitutional:       Appearance: Normal appearance. She is well-developed.   HENT:      Head: Normocephalic and atraumatic.      Right Ear: External ear normal.      Left Ear: External ear normal.      Mouth/Throat:      Pharynx: No oropharyngeal exudate.   Eyes:      Conjunctiva/sclera: Conjunctivae normal.      Pupils: Pupils are equal, round, and reactive to light.   Cardiovascular:      Rate and Rhythm: Normal rate and regular rhythm.      Pulses: Normal pulses.      Heart sounds: Normal heart sounds. No murmur heard.    No friction rub. No gallop.   Pulmonary:      Effort: Pulmonary effort is normal.      Breath sounds: Normal breath sounds. No wheezing or rhonchi.   Abdominal:      General: Bowel sounds are normal. There is no distension.      Palpations: Abdomen is soft.      Tenderness: There is no abdominal tenderness.   Musculoskeletal:         General: Normal range of motion.      Comments: Right wrist medial and lateral tenderness, mild swelling, no bruising, redness, or warmth, stable.   Skin:     General: Skin is warm and dry.      Capillary Refill: Capillary refill takes less than 2 seconds.   Neurological:      General: No focal deficit present.      Mental Status: She is alert and oriented to " person, place, and time.      Cranial Nerves: No cranial nerve deficit.   Psychiatric:         Mood and Affect: Mood and affect normal.         Behavior: Behavior normal.         Thought Content: Thought content normal.         Judgment: Judgment normal.        Result Review :   The following data was reviewed by: Zaid Renteria MD on 04/25/2022:    Data reviewed: Radiologic studies I viewed and interpreted 3 views right wrist x-rays:no fxs.          Assessment and Plan    Diagnoses and all orders for this visit:    1. Right wrist pain (Primary)  -     XR Wrist 3+ View Right (In Office)  -     Ambulatory Referral to Orthopedic Surgery  -     diclofenac (VOLTAREN) 75 MG EC tablet; Take 1 tablet by mouth 2 (Two) Times a Day.  Dispense: 28 tablet; Refill: 0            Follow Up   Return if symptoms worsen or fail to improve.  Patient was given instructions and counseling regarding her condition or for health maintenance advice. Please see specific information pulled into the AVS if appropriate.   Pt put in right wrist splint.

## 2022-04-28 ENCOUNTER — OFFICE VISIT (OUTPATIENT)
Dept: ORTHOPEDIC SURGERY | Facility: CLINIC | Age: 52
End: 2022-04-28

## 2022-04-28 VITALS — HEART RATE: 86 BPM | OXYGEN SATURATION: 97 % | WEIGHT: 175 LBS | BODY MASS INDEX: 27.47 KG/M2 | HEIGHT: 67 IN

## 2022-04-28 DIAGNOSIS — M25.431 WRIST SWELLING, RIGHT: Primary | ICD-10-CM

## 2022-04-28 PROCEDURE — 99203 OFFICE O/P NEW LOW 30 MIN: CPT | Performed by: ORTHOPAEDIC SURGERY

## 2022-04-28 PROCEDURE — 20605 DRAIN/INJ JOINT/BURSA W/O US: CPT | Performed by: ORTHOPAEDIC SURGERY

## 2022-04-28 RX ORDER — LIDOCAINE HYDROCHLORIDE 10 MG/ML
1 INJECTION, SOLUTION INFILTRATION; PERINEURAL
Status: COMPLETED | OUTPATIENT
Start: 2022-04-28 | End: 2022-04-28

## 2022-04-28 RX ORDER — TRIAMCINOLONE ACETONIDE 40 MG/ML
40 INJECTION, SUSPENSION INTRA-ARTICULAR; INTRAMUSCULAR
Status: COMPLETED | OUTPATIENT
Start: 2022-04-28 | End: 2022-04-28

## 2022-04-28 RX ADMIN — LIDOCAINE HYDROCHLORIDE 1 ML: 10 INJECTION, SOLUTION INFILTRATION; PERINEURAL at 11:21

## 2022-04-28 RX ADMIN — TRIAMCINOLONE ACETONIDE 40 MG: 40 INJECTION, SUSPENSION INTRA-ARTICULAR; INTRAMUSCULAR at 11:21

## 2022-04-28 NOTE — PROGRESS NOTES
"Chief Complaint  Pain and Initial Evaluation of the Right Wrist     Subjective      Nora Walker presents to [facility] for evaluation of the right wrist. The patient injured her wrist previously and had to have surgery at [facility]. She is wearing a wrist brace. She reports pain and stiffness to the wrist. She states popping to the wrist with ROM. She has no new injury. She does have rheumatoid arthritis in the wrist. She has been taking diclofenac. She reports pain for about 4 weeks.     Allergies   Allergen Reactions   • Adhesive Tape Rash     RASH FROM SOME TAPES AFTER ON OVER 1 DAY        Social History     Socioeconomic History   • Marital status:    Tobacco Use   • Smoking status: Never Smoker   • Smokeless tobacco: Never Used   Vaping Use   • Vaping Use: Never used   Substance and Sexual Activity   • Alcohol use: Not Currently   • Drug use: Never   • Sexual activity: Yes     Partners: Male     Birth control/protection: Post-menopausal        Review of Systems     Objective   Vital Signs:   Pulse 86   Ht 170.2 cm (67\")   Wt 79.4 kg (175 lb)   SpO2 97%   BMI 27.41 kg/m²       Physical Exam  Constitutional:       Appearance: Normal appearance. The patient is well-developed and normal weight.   HENT:      Head: Normocephalic.      Right Ear: Hearing and external ear normal.      Left Ear: Hearing and external ear normal.      Nose: Nose normal.   Eyes:      Conjunctiva/sclera: Conjunctivae normal.   Cardiovascular:      Rate and Rhythm: Normal rate.   Pulmonary:      Effort: Pulmonary effort is normal.      Breath sounds: No wheezing or rales.   Abdominal:      Palpations: Abdomen is soft.      Tenderness: There is no abdominal tenderness.   Musculoskeletal:      Cervical back: Normal range of motion.   Skin:     Findings: No rash.   Neurological:      Mental Status: The patient is alert and oriented to person, place, and time.   Psychiatric:         Mood and Affect: Mood " and affect normal.         Judgment: Judgment normal.       Ortho Exam      Right wrist- Well healed scar to ulnar side of right wrist. Mild tenderness to the ulnar wrist. Popping with ulnar deviation. No pain with radial ulnar deviation. Flexion 50 extension 55. prominence of distal head of the ulna. Sensation to light touch median, radial, ulnar nerve. Positive AIN, PIN, ulnar nerve. Positive pulses.     Right wrist   Consent given by: patient  Site marked: site marked  Timeout: Immediately prior to procedure a time out was called to verify the correct patient, procedure, equipment, support staff and site/side marked as required   Supporting Documentation  Indications: pain   Procedure Details  Location: Right wrist   Preparation: Patient was prepped and draped in the usual sterile fashion  Needle gauge: 23G.  Medications administered: 1 mL lidocaine 1 %; 40 mg triamcinolone acetonide 40 MG/ML  Patient tolerance: patient tolerated the procedure well with no immediate complications            Imaging Results (Most Recent)     None           Result Review :       XR Wrist 3+ View Right (In Office)    Result Date: 4/25/2022  Narrative: PROCEDURE: XR WRIST 3+ VW RIGHT  COMPARISON: [facility], , WRIST >OR= 3V RT, 8/17/2017, 14:46.  INDICATIONS: right wrist medial and lateral pain X 6 weeks. No injury Hx of wrist surgery 13 years ago from fx.  FINDINGS:  There is focal soft tissue prominence at the medial and dorsal aspect of the distal ulna.  Osseous alignment appears unchanged.  There is mild positive ulnar variance.  There are small calcifications distal to the ulnar styloid, as before.  These again may be the sequelae of prior injury.  There is suspected mild osteoarthritis at the 1st CMC and triscaphe joints.  Probable subchondral cyst versus erosion is noted at the proximal lunate.  No suspicious calcifications are identified.  No definite acute fracture is seen.      Impression:   1. Soft tissue  prominence at the distal ulna.  This is nonspecific but could be seen with extensor carpi ulnaris tendon pathology.  Correlate with clinical signs and symptoms. 2. Findings of suspected remote injury at the ulnar styloid, as before. 3. Subchondral cyst versus erosion of the proximal lunate.  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      GASTON MOSER MD       Electronically Signed and Approved By: GASTON MOSER MD on 4/25/2022 at 11:26             CT Chest With Contrast    Result Date: 4/6/2022  Narrative: PROCEDURE: CT CHEST W CONTRAST DIAGNOSTIC  COMPARISON:  [facility], CT, CHEST W/ CONTRAST, 3/19/2019, 8:51.  [facility], CT, CHEST W/O CONTRAST, 8/21/2019, 11:17. INDICATIONS: SOB following covid with mottled skin  PROTOCOL:   Pulmonary embolism imaging protocol performed    RADIATION:   DLP: 107mGy*cm   Automated exposure control was utilized to minimize radiation dose. CONTRAST: 100cc Isovue 370 I.V.  TECHNIQUE: Axial images of the chest with intravenous contrast.  FINDINGS: No pulmonary emboli are identified.  The thoracic aorta has a normal caliber.  No evidence of pleural or pericardial effusion.  No evidence of pneumothorax.  There is no mediastinal, axillary or hilar adenopathy.  There are areas of mild ground-glass opacity in the mid and lower lung fields consistent with the patient's history of COVID-19 infection.  Images of the upper abdomen are unremarkable.  Mild degenerative changes are present in the thoracic spine.  The patient experienced contrast extravasation in the left antecubital fossa.  No evidence of skin discoloration.  Good capillary refill is present.  Good symmetric pulses are present in the wrists.  The patient was advised to keep the left upper extremity elevated with warm compresses.  IMPRESSION:  1. No pulmonary emboli are identified.  2. Areas of mild ground-glass opacity in the mid and lower lung fields  consistent with the patient's history of COVID-19 infection.   This report was communicated by telephone to Merline ROQUE at the dictation time shown below.   CHRYSTAL HAYES MD       Electronically Signed and Approved By: CHRYSTAL HAYES MD on 4/06/2022 at 14:03                      Assessment and Plan     DX: Right wrist swelling    Discussed the treatment plan with the patient.  Discussed the risks and benefits of a right wrist injection. The patient expressed understanding and wished to proceed. She tolerated the injection well. Plan to continue bracing.     Call or return if worsening symptoms.    Follow Up     4 weeks      Patient was given instructions and counseling regarding her condition or for health maintenance advice. Please see specific information pulled into the AVS if appropriate.     Scribed for Norbert Rock MD by Karen Moura.  04/28/22   11:08 EDT    I have personally performed the services described in this document as scribed by the above individual and it is both accurate and complete. Norbert Rock MD 04/28/22

## 2022-05-12 DIAGNOSIS — M25.531 RIGHT WRIST PAIN: ICD-10-CM

## 2022-05-12 RX ORDER — DICLOFENAC SODIUM 75 MG/1
75 TABLET, DELAYED RELEASE ORAL 2 TIMES DAILY
Qty: 60 TABLET | Refills: 0 | Status: SHIPPED | OUTPATIENT
Start: 2022-05-12 | End: 2022-12-17

## 2022-07-25 ENCOUNTER — TRANSCRIBE ORDERS (OUTPATIENT)
Dept: LAB | Facility: HOSPITAL | Age: 52
End: 2022-07-25

## 2022-07-25 ENCOUNTER — LAB (OUTPATIENT)
Dept: LAB | Facility: HOSPITAL | Age: 52
End: 2022-07-25

## 2022-07-25 DIAGNOSIS — Z11.59 SPECIAL SCREENING EXAMINATION FOR VIRAL DISEASE: ICD-10-CM

## 2022-07-25 DIAGNOSIS — Z79.899 ENCOUNTER FOR LONG-TERM (CURRENT) USE OF OTHER MEDICATIONS: ICD-10-CM

## 2022-07-25 DIAGNOSIS — M25.50 ARTHRALGIA, UNSPECIFIED JOINT: ICD-10-CM

## 2022-07-25 DIAGNOSIS — M05.79 SEROPOSITIVE RHEUMATOID ARTHRITIS OF MULTIPLE SITES: ICD-10-CM

## 2022-07-25 DIAGNOSIS — Z11.1 SCREENING-PULMONARY TB: ICD-10-CM

## 2022-07-25 DIAGNOSIS — E55.9 VITAMIN D DEFICIENCY, UNSPECIFIED: ICD-10-CM

## 2022-07-25 DIAGNOSIS — E78.5 HYPERLIPIDEMIA, UNSPECIFIED HYPERLIPIDEMIA TYPE: ICD-10-CM

## 2022-07-25 DIAGNOSIS — M05.79 SEROPOSITIVE RHEUMATOID ARTHRITIS OF MULTIPLE SITES: Primary | ICD-10-CM

## 2022-07-25 LAB
25(OH)D3 SERPL-MCNC: 38.2 NG/ML (ref 30–100)
BASOPHILS # BLD AUTO: 0.05 10*3/MM3 (ref 0–0.2)
BASOPHILS NFR BLD AUTO: 0.9 % (ref 0–1.5)
BUN SERPL-MCNC: 13 MG/DL (ref 6–20)
CHOLEST SERPL-MCNC: 201 MG/DL (ref 0–200)
CREAT SERPL-MCNC: 0.94 MG/DL (ref 0.57–1)
DEPRECATED RDW RBC AUTO: 41.2 FL (ref 37–54)
EGFRCR SERPLBLD CKD-EPI 2021: 72.7 ML/MIN/1.73
EOSINOPHIL # BLD AUTO: 0.09 10*3/MM3 (ref 0–0.4)
EOSINOPHIL NFR BLD AUTO: 1.7 % (ref 0.3–6.2)
ERYTHROCYTE [DISTWIDTH] IN BLOOD BY AUTOMATED COUNT: 13.3 % (ref 12.3–15.4)
ERYTHROCYTE [SEDIMENTATION RATE] IN BLOOD: 10 MM/HR (ref 0–30)
HAV IGM SERPL QL IA: NORMAL
HBV CORE IGM SERPL QL IA: NORMAL
HBV SURFACE AG SERPL QL IA: NORMAL
HCT VFR BLD AUTO: 37.1 % (ref 34–46.6)
HCV AB SER DONR QL: NORMAL
HDLC SERPL-MCNC: 53 MG/DL (ref 40–60)
HGB BLD-MCNC: 12.6 G/DL (ref 12–15.9)
IMM GRANULOCYTES # BLD AUTO: 0.01 10*3/MM3 (ref 0–0.05)
IMM GRANULOCYTES NFR BLD AUTO: 0.2 % (ref 0–0.5)
LDLC SERPL CALC-MCNC: 127 MG/DL (ref 0–100)
LDLC/HDLC SERPL: 2.35 {RATIO}
LYMPHOCYTES # BLD AUTO: 2.75 10*3/MM3 (ref 0.7–3.1)
LYMPHOCYTES NFR BLD AUTO: 51.1 % (ref 19.6–45.3)
MCH RBC QN AUTO: 29.3 PG (ref 26.6–33)
MCHC RBC AUTO-ENTMCNC: 34 G/DL (ref 31.5–35.7)
MCV RBC AUTO: 86.3 FL (ref 79–97)
MONOCYTES # BLD AUTO: 0.48 10*3/MM3 (ref 0.1–0.9)
MONOCYTES NFR BLD AUTO: 8.9 % (ref 5–12)
NEUTROPHILS NFR BLD AUTO: 2 10*3/MM3 (ref 1.7–7)
NEUTROPHILS NFR BLD AUTO: 37.2 % (ref 42.7–76)
NRBC BLD AUTO-RTO: 0 /100 WBC (ref 0–0.2)
PLATELET # BLD AUTO: 227 10*3/MM3 (ref 140–450)
PMV BLD AUTO: 11.5 FL (ref 6–12)
RBC # BLD AUTO: 4.3 10*6/MM3 (ref 3.77–5.28)
TRIGL SERPL-MCNC: 118 MG/DL (ref 0–150)
VLDLC SERPL-MCNC: 21 MG/DL (ref 5–40)
WBC NRBC COR # BLD: 5.38 10*3/MM3 (ref 3.4–10.8)

## 2022-07-25 PROCEDURE — 86480 TB TEST CELL IMMUN MEASURE: CPT

## 2022-07-25 PROCEDURE — 85652 RBC SED RATE AUTOMATED: CPT

## 2022-07-25 PROCEDURE — 82306 VITAMIN D 25 HYDROXY: CPT

## 2022-07-25 PROCEDURE — 82565 ASSAY OF CREATININE: CPT

## 2022-07-25 PROCEDURE — 84520 ASSAY OF UREA NITROGEN: CPT

## 2022-07-25 PROCEDURE — 36415 COLL VENOUS BLD VENIPUNCTURE: CPT

## 2022-07-25 PROCEDURE — 85025 COMPLETE CBC W/AUTO DIFF WBC: CPT

## 2022-07-25 PROCEDURE — 80074 ACUTE HEPATITIS PANEL: CPT

## 2022-07-25 PROCEDURE — 80061 LIPID PANEL: CPT

## 2022-07-27 LAB
GAMMA INTERFERON BACKGROUND BLD IA-ACNC: 0 IU/ML
M TB IFN-G BLD-IMP: NEGATIVE
M TB IFN-G CD4+ BCKGRND COR BLD-ACNC: 0 IU/ML
M TB IFN-G CD4+CD8+ BCKGRND COR BLD-ACNC: 0 IU/ML
MITOGEN IGNF BLD-ACNC: 2.43 IU/ML
QUANTIFERON INCUBATION: NORMAL
SERVICE CMNT-IMP: NORMAL

## 2022-10-26 ENCOUNTER — TRANSCRIBE ORDERS (OUTPATIENT)
Dept: ADMINISTRATIVE | Facility: HOSPITAL | Age: 52
End: 2022-10-26

## 2022-10-26 DIAGNOSIS — Z78.0 POST-MENOPAUSAL: Primary | ICD-10-CM

## 2022-11-03 ENCOUNTER — HOSPITAL ENCOUNTER (OUTPATIENT)
Dept: BONE DENSITY | Facility: HOSPITAL | Age: 52
Discharge: HOME | End: 2022-11-03
Admitting: INTERNAL MEDICINE

## 2022-11-03 DIAGNOSIS — Z78.0 POST-MENOPAUSAL: ICD-10-CM

## 2022-11-03 PROCEDURE — 77080 DXA BONE DENSITY AXIAL: CPT

## 2023-08-15 ENCOUNTER — TRANSCRIBE ORDERS (OUTPATIENT)
Dept: ADMINISTRATIVE | Facility: HOSPITAL | Age: 53
End: 2023-08-15
Payer: OTHER GOVERNMENT

## 2023-08-15 ENCOUNTER — LAB (OUTPATIENT)
Dept: LAB | Facility: HOSPITAL | Age: 53
End: 2023-08-15
Payer: OTHER GOVERNMENT

## 2023-08-15 DIAGNOSIS — E55.9 AVITAMINOSIS D: ICD-10-CM

## 2023-08-15 DIAGNOSIS — Z79.899 ENCOUNTER FOR LONG-TERM (CURRENT) USE OF OTHER MEDICATIONS: ICD-10-CM

## 2023-08-15 DIAGNOSIS — Z79.899 ENCOUNTER FOR LONG-TERM (CURRENT) USE OF OTHER MEDICATIONS: Primary | ICD-10-CM

## 2023-08-15 DIAGNOSIS — E78.00 HYPERCHOLESTEREMIA: Primary | ICD-10-CM

## 2023-08-15 DIAGNOSIS — E78.00 HYPERCHOLESTEREMIA: ICD-10-CM

## 2023-08-15 LAB
25(OH)D3 SERPL-MCNC: 35.9 NG/ML (ref 30–100)
ALBUMIN SERPL-MCNC: 4.8 G/DL (ref 3.5–5.2)
ALBUMIN/GLOB SERPL: 1.6 G/DL
ALP SERPL-CCNC: 71 U/L (ref 39–117)
ALT SERPL W P-5'-P-CCNC: 21 U/L (ref 1–33)
ANION GAP SERPL CALCULATED.3IONS-SCNC: 10.4 MMOL/L (ref 5–15)
AST SERPL-CCNC: 26 U/L (ref 1–32)
BASOPHILS # BLD AUTO: 0.05 10*3/MM3 (ref 0–0.2)
BASOPHILS NFR BLD AUTO: 1.2 % (ref 0–1.5)
BILIRUB SERPL-MCNC: 0.4 MG/DL (ref 0–1.2)
BUN SERPL-MCNC: 17 MG/DL (ref 6–20)
BUN/CREAT SERPL: 17.2 (ref 7–25)
CALCIUM SPEC-SCNC: 10.1 MG/DL (ref 8.6–10.5)
CHLORIDE SERPL-SCNC: 104 MMOL/L (ref 98–107)
CHOLEST SERPL-MCNC: 271 MG/DL (ref 0–200)
CO2 SERPL-SCNC: 25.6 MMOL/L (ref 22–29)
CREAT SERPL-MCNC: 0.99 MG/DL (ref 0.57–1)
CRP SERPL-MCNC: <0.3 MG/DL (ref 0–0.5)
DEPRECATED RDW RBC AUTO: 44 FL (ref 37–54)
EGFRCR SERPLBLD CKD-EPI 2021: 67.9 ML/MIN/1.73
EOSINOPHIL # BLD AUTO: 0.07 10*3/MM3 (ref 0–0.4)
EOSINOPHIL NFR BLD AUTO: 1.7 % (ref 0.3–6.2)
ERYTHROCYTE [DISTWIDTH] IN BLOOD BY AUTOMATED COUNT: 13.9 % (ref 12.3–15.4)
GLOBULIN UR ELPH-MCNC: 3 GM/DL
GLUCOSE SERPL-MCNC: 96 MG/DL (ref 65–99)
HCT VFR BLD AUTO: 40.2 % (ref 34–46.6)
HDLC SERPL-MCNC: 52 MG/DL (ref 40–60)
HGB BLD-MCNC: 13.5 G/DL (ref 12–15.9)
IMM GRANULOCYTES # BLD AUTO: 0.01 10*3/MM3 (ref 0–0.05)
IMM GRANULOCYTES NFR BLD AUTO: 0.2 % (ref 0–0.5)
LDLC SERPL CALC-MCNC: 181 MG/DL (ref 0–100)
LDLC/HDLC SERPL: 3.42 {RATIO}
LYMPHOCYTES # BLD AUTO: 1.86 10*3/MM3 (ref 0.7–3.1)
LYMPHOCYTES NFR BLD AUTO: 44.3 % (ref 19.6–45.3)
MCH RBC QN AUTO: 29.4 PG (ref 26.6–33)
MCHC RBC AUTO-ENTMCNC: 33.6 G/DL (ref 31.5–35.7)
MCV RBC AUTO: 87.6 FL (ref 79–97)
MONOCYTES # BLD AUTO: 0.32 10*3/MM3 (ref 0.1–0.9)
MONOCYTES NFR BLD AUTO: 7.6 % (ref 5–12)
NEUTROPHILS NFR BLD AUTO: 1.89 10*3/MM3 (ref 1.7–7)
NEUTROPHILS NFR BLD AUTO: 45 % (ref 42.7–76)
NRBC BLD AUTO-RTO: 0 /100 WBC (ref 0–0.2)
PLATELET # BLD AUTO: 289 10*3/MM3 (ref 140–450)
PMV BLD AUTO: 10.4 FL (ref 6–12)
POTASSIUM SERPL-SCNC: 4.3 MMOL/L (ref 3.5–5.2)
PROT SERPL-MCNC: 7.8 G/DL (ref 6–8.5)
RBC # BLD AUTO: 4.59 10*6/MM3 (ref 3.77–5.28)
SODIUM SERPL-SCNC: 140 MMOL/L (ref 136–145)
T4 FREE SERPL-MCNC: 0.95 NG/DL (ref 0.93–1.7)
TRIGL SERPL-MCNC: 205 MG/DL (ref 0–150)
TSH SERPL DL<=0.05 MIU/L-ACNC: 2.25 UIU/ML (ref 0.27–4.2)
VLDLC SERPL-MCNC: 38 MG/DL (ref 5–40)
WBC NRBC COR # BLD: 4.2 10*3/MM3 (ref 3.4–10.8)

## 2023-08-15 PROCEDURE — 84439 ASSAY OF FREE THYROXINE: CPT

## 2023-08-15 PROCEDURE — 85025 COMPLETE CBC W/AUTO DIFF WBC: CPT

## 2023-08-15 PROCEDURE — 36415 COLL VENOUS BLD VENIPUNCTURE: CPT

## 2023-08-15 PROCEDURE — 86140 C-REACTIVE PROTEIN: CPT

## 2023-08-15 PROCEDURE — 80061 LIPID PANEL: CPT

## 2023-08-15 PROCEDURE — 80053 COMPREHEN METABOLIC PANEL: CPT

## 2023-08-15 PROCEDURE — 84443 ASSAY THYROID STIM HORMONE: CPT

## 2023-08-15 PROCEDURE — 82306 VITAMIN D 25 HYDROXY: CPT

## 2023-09-09 ENCOUNTER — APPOINTMENT (OUTPATIENT)
Dept: GENERAL RADIOLOGY | Facility: HOSPITAL | Age: 53
End: 2023-09-09
Payer: OTHER GOVERNMENT

## 2023-09-09 ENCOUNTER — HOSPITAL ENCOUNTER (EMERGENCY)
Facility: HOSPITAL | Age: 53
Discharge: HOME | End: 2023-09-10
Attending: EMERGENCY MEDICINE
Payer: OTHER GOVERNMENT

## 2023-09-09 VITALS
HEIGHT: 67 IN | OXYGEN SATURATION: 100 % | BODY MASS INDEX: 26.44 KG/M2 | DIASTOLIC BLOOD PRESSURE: 88 MMHG | HEART RATE: 72 BPM | SYSTOLIC BLOOD PRESSURE: 126 MMHG | TEMPERATURE: 97.5 F | WEIGHT: 168.43 LBS | RESPIRATION RATE: 18 BRPM

## 2023-09-09 DIAGNOSIS — R07.89 CHEST DISCOMFORT: Primary | ICD-10-CM

## 2023-09-09 DIAGNOSIS — I49.3 FREQUENT PVCS: ICD-10-CM

## 2023-09-09 LAB
ALBUMIN SERPL-MCNC: 4.7 G/DL (ref 3.5–5.2)
ALBUMIN/GLOB SERPL: 1.7 G/DL
ALP SERPL-CCNC: 71 U/L (ref 39–117)
ALT SERPL W P-5'-P-CCNC: 12 U/L (ref 1–33)
ANION GAP SERPL CALCULATED.3IONS-SCNC: 10.4 MMOL/L (ref 5–15)
AST SERPL-CCNC: 17 U/L (ref 1–32)
BASOPHILS # BLD AUTO: 0.06 10*3/MM3 (ref 0–0.2)
BASOPHILS NFR BLD AUTO: 1.3 % (ref 0–1.5)
BILIRUB SERPL-MCNC: 0.4 MG/DL (ref 0–1.2)
BUN SERPL-MCNC: 14 MG/DL (ref 6–20)
BUN/CREAT SERPL: 13 (ref 7–25)
CALCIUM SPEC-SCNC: 9.6 MG/DL (ref 8.6–10.5)
CHLORIDE SERPL-SCNC: 103 MMOL/L (ref 98–107)
CO2 SERPL-SCNC: 27.6 MMOL/L (ref 22–29)
CREAT SERPL-MCNC: 1.08 MG/DL (ref 0.57–1)
DEPRECATED RDW RBC AUTO: 43.4 FL (ref 37–54)
EGFRCR SERPLBLD CKD-EPI 2021: 61.2 ML/MIN/1.73
EOSINOPHIL # BLD AUTO: 0.07 10*3/MM3 (ref 0–0.4)
EOSINOPHIL NFR BLD AUTO: 1.5 % (ref 0.3–6.2)
ERYTHROCYTE [DISTWIDTH] IN BLOOD BY AUTOMATED COUNT: 13.7 % (ref 12.3–15.4)
GEN 5 2HR TROPONIN T REFLEX: <6 NG/L
GLOBULIN UR ELPH-MCNC: 2.8 GM/DL
GLUCOSE SERPL-MCNC: 101 MG/DL (ref 65–99)
HCT VFR BLD AUTO: 37 % (ref 34–46.6)
HGB BLD-MCNC: 12.6 G/DL (ref 12–15.9)
HOLD SPECIMEN: NORMAL
HOLD SPECIMEN: NORMAL
IMM GRANULOCYTES # BLD AUTO: 0.01 10*3/MM3 (ref 0–0.05)
IMM GRANULOCYTES NFR BLD AUTO: 0.2 % (ref 0–0.5)
LIPASE SERPL-CCNC: 28 U/L (ref 13–60)
LYMPHOCYTES # BLD AUTO: 1.91 10*3/MM3 (ref 0.7–3.1)
LYMPHOCYTES NFR BLD AUTO: 41.6 % (ref 19.6–45.3)
MAGNESIUM SERPL-MCNC: 2.1 MG/DL (ref 1.6–2.6)
MCH RBC QN AUTO: 29.8 PG (ref 26.6–33)
MCHC RBC AUTO-ENTMCNC: 34.1 G/DL (ref 31.5–35.7)
MCV RBC AUTO: 87.5 FL (ref 79–97)
MONOCYTES # BLD AUTO: 0.42 10*3/MM3 (ref 0.1–0.9)
MONOCYTES NFR BLD AUTO: 9.2 % (ref 5–12)
NEUTROPHILS NFR BLD AUTO: 2.12 10*3/MM3 (ref 1.7–7)
NEUTROPHILS NFR BLD AUTO: 46.2 % (ref 42.7–76)
NRBC BLD AUTO-RTO: 0 /100 WBC (ref 0–0.2)
NT-PROBNP SERPL-MCNC: 72.6 PG/ML (ref 0–900)
PLATELET # BLD AUTO: 234 10*3/MM3 (ref 140–450)
PMV BLD AUTO: 9.8 FL (ref 6–12)
POTASSIUM SERPL-SCNC: 4.1 MMOL/L (ref 3.5–5.2)
PROT SERPL-MCNC: 7.5 G/DL (ref 6–8.5)
RBC # BLD AUTO: 4.23 10*6/MM3 (ref 3.77–5.28)
SODIUM SERPL-SCNC: 141 MMOL/L (ref 136–145)
TROPONIN T DELTA: NORMAL
TROPONIN T SERPL HS-MCNC: <6 NG/L
WBC NRBC COR # BLD: 4.59 10*3/MM3 (ref 3.4–10.8)
WHOLE BLOOD HOLD COAG: NORMAL
WHOLE BLOOD HOLD SPECIMEN: NORMAL

## 2023-09-09 PROCEDURE — 71045 X-RAY EXAM CHEST 1 VIEW: CPT

## 2023-09-09 PROCEDURE — 83690 ASSAY OF LIPASE: CPT | Performed by: EMERGENCY MEDICINE

## 2023-09-09 PROCEDURE — 85025 COMPLETE CBC W/AUTO DIFF WBC: CPT

## 2023-09-09 PROCEDURE — 36415 COLL VENOUS BLD VENIPUNCTURE: CPT

## 2023-09-09 PROCEDURE — 99284 EMERGENCY DEPT VISIT MOD MDM: CPT

## 2023-09-09 PROCEDURE — 83880 ASSAY OF NATRIURETIC PEPTIDE: CPT | Performed by: EMERGENCY MEDICINE

## 2023-09-09 PROCEDURE — 83735 ASSAY OF MAGNESIUM: CPT | Performed by: EMERGENCY MEDICINE

## 2023-09-09 PROCEDURE — 93005 ELECTROCARDIOGRAM TRACING: CPT | Performed by: EMERGENCY MEDICINE

## 2023-09-09 PROCEDURE — 93005 ELECTROCARDIOGRAM TRACING: CPT

## 2023-09-09 PROCEDURE — 84484 ASSAY OF TROPONIN QUANT: CPT | Performed by: EMERGENCY MEDICINE

## 2023-09-09 PROCEDURE — 80053 COMPREHEN METABOLIC PANEL: CPT | Performed by: EMERGENCY MEDICINE

## 2023-09-09 RX ORDER — SODIUM CHLORIDE 0.9 % (FLUSH) 0.9 %
10 SYRINGE (ML) INJECTION AS NEEDED
Status: DISCONTINUED | OUTPATIENT
Start: 2023-09-09 | End: 2023-09-10 | Stop reason: HOSPADM

## 2023-09-09 RX ORDER — ASPIRIN 81 MG/1
324 TABLET, CHEWABLE ORAL ONCE
Status: COMPLETED | OUTPATIENT
Start: 2023-09-09 | End: 2023-09-09

## 2023-09-09 RX ADMIN — METOPROLOL TARTRATE 12.5 MG: 25 TABLET, FILM COATED ORAL at 22:40

## 2023-09-09 RX ADMIN — ASPIRIN 324 MG: 81 TABLET, CHEWABLE ORAL at 21:18

## 2023-09-10 LAB
QT INTERVAL: 376 MS
QT INTERVAL: 398 MS
QTC INTERVAL: 459 MS
QTC INTERVAL: 478 MS

## 2023-09-10 NOTE — ED TRIAGE NOTES
"Patient complains of right anterior chest pain. \"My chest pain is always there but tonight it is different.\" She states she noticed she was dizzy last night and tonight. \"I am having flutters nonstop.\" Patient denies vomiting or sweating. Admits nausea. Denies taking any medications for the chest pain before arrival. \"It feels like a sharp pain and fluttering. Radiates to my jaw.\"   "

## 2023-09-10 NOTE — ED PROVIDER NOTES
Time: 8:54 PM EDT  Date of encounter:  9/9/2023  Independent Historian/Clinical History and Information was obtained by:   Patient    History is limited by: N/A    Chief Complaint: Chest pain      History of Present Illness:  Patient is a 53 y.o. year old female who presents to the emergency department for evaluation of dizziness, chest pain, heart fluttering.  She said her chest always hurts and she has always had fluttering but today it is nonstop and she is dizzy.  She said she was dizzy last night when she went to work at midnight then today when she woke up she had a slight headache.  She noticed a fluttering and also had a sharp pain in her chest at times.  She also reports jaw pain.  She said she just feels like something was not right and she used to be a CNA and she felt like she needed to be here to get checked out.    Patient states she uses caffeine and energy drinks occasionally but not large quantities or very often.    HPI    Patient Care Team  Primary Care Provider: Zaid Renteria MD    Past Medical History:     Allergies   Allergen Reactions    Adhesive Tape Rash     RASH FROM SOME TAPES AFTER ON OVER 1 DAY     Past Medical History:   Diagnosis Date    Allergic rhinitis     Arthritis of back 2002    Arthritis of neck 2002    Bursitis of hip 2020    Fracture of wrist 2008    Fracture, foot 2021    Frozen shoulder 2002    GERD (gastroesophageal reflux disease)     Hip arthrosis 2020    Low back pain 2012    Low back strain 2009    Fusion    Neuroma of foot     Possible    Periarthritis of shoulder 2/02    Rheumatoid arthritis     Wrist sprain      Past Surgical History:   Procedure Laterality Date    BACK SURGERY      low back disc    COLONOSCOPY  1998    FOOT SURGERY      FRACTURE SURGERY  2021    R foot 2nd toe    HAND SURGERY  2010    R wrist    HYSTERECTOMY      SPINE SURGERY  [id number]    Multi    TRIGGER POINT INJECTION      WRIST SURGERY       Family History   Problem Relation Age of  "Onset    Depression Mother     Osteoporosis Mother     Alcohol abuse Mother        Home Medications:  Prior to Admission medications    Medication Sig Start Date End Date Taking? Authorizing Provider   amoxicillin (AMOXIL) 875 MG tablet Take 1 tablet by mouth 2 (Two) Times a Day. 12/17/22   iNcky Flores MD   ciprofloxacin (CILOXAN) 0.3 % ophthalmic solution Administer 1 drop to the right eye Every 4 (Four) Hours. 12/17/22   Nicky Flores MD   cyclobenzaprine (FLEXERIL) 10 MG tablet Take 1 tablet by mouth Daily.    ProviderJeannine MD   Rinvoq 15 MG tablet sustained-release 24 hour  10/26/22   ProviderJeannine MD        Social History:   Social History     Tobacco Use    Smoking status: Never    Smokeless tobacco: Never   Vaping Use    Vaping Use: Never used   Substance Use Topics    Alcohol use: Not Currently    Drug use: Never         Review of Systems:  Review of Systems   Constitutional:  Negative for chills, diaphoresis and fever.   HENT:  Negative for congestion, ear pain and sore throat.    Eyes:  Negative for pain.   Respiratory:  Negative for cough, chest tightness and shortness of breath.    Cardiovascular:  Positive for chest pain and palpitations.   Gastrointestinal:  Positive for nausea. Negative for abdominal pain, diarrhea and vomiting.   Genitourinary:  Negative for flank pain and hematuria.   Musculoskeletal:  Negative for joint swelling.   Skin:  Negative for pallor.   Neurological:  Negative for seizures and headaches.   All other systems reviewed and are negative.     Physical Exam:  /88   Pulse 72   Temp 97.5 °F (36.4 °C) (Oral)   Resp 18   Ht 170.2 cm (67\")   Wt 76.4 kg (168 lb 6.9 oz)   SpO2 100%   BMI 26.38 kg/m²     Physical Exam  Vitals and nursing note reviewed.   Constitutional:       General: She is not in acute distress.     Appearance: Normal appearance. She is well-developed and normal weight. She is not ill-appearing or toxic-appearing.   HENT:      Head: " Normocephalic and atraumatic.      Jaw: There is normal jaw occlusion.   Eyes:      General: Lids are normal.      Extraocular Movements: Extraocular movements intact.      Conjunctiva/sclera: Conjunctivae normal.      Pupils: Pupils are equal, round, and reactive to light.   Cardiovascular:      Rate and Rhythm: Normal rate. Rhythm irregular.      Pulses: Normal pulses.      Heart sounds: Normal heart sounds.   Pulmonary:      Effort: Pulmonary effort is normal. No respiratory distress.      Breath sounds: Normal breath sounds. No wheezing or rhonchi.   Abdominal:      General: Abdomen is flat.      Palpations: Abdomen is soft.      Tenderness: There is no abdominal tenderness. There is no guarding or rebound.   Musculoskeletal:         General: Normal range of motion.      Cervical back: Normal range of motion and neck supple.      Right lower leg: No edema.      Left lower leg: No edema.   Skin:     General: Skin is warm and dry.   Neurological:      General: No focal deficit present.      Mental Status: She is alert and oriented to person, place, and time. Mental status is at baseline.   Psychiatric:         Mood and Affect: Mood normal.              Procedures:  Procedures      Medical Decision Making:      Comorbidities that affect care:    Rheumatoid arthritis, GERD    External Notes reviewed:    Previous Clinic Note: Urgent care visit December 2022      The following orders were placed and all results were independently analyzed by me:  Orders Placed This Encounter   Procedures    XR Chest 1 View    [place] Draw    High Sensitivity Troponin T    Comprehensive Metabolic Panel    Lipase    BNP    Magnesium    CBC Auto Differential    High Sensitivity Troponin T 2Hr    Ambulatory Referral to Cardiology    Undress & Gown    Continuous Pulse Oximetry    ECG 12 Lead ED Triage Standing Order; Chest Pain    CBC & Differential    Green Top (Gel)    Lavender Top    Gold Top - SST    Light Blue Top       Medications  Given in the Emergency Department:  Medications   aspirin chewable tablet 324 mg (324 mg Oral Given 9/9/23 2118)   metoprolol tartrate (LOPRESSOR) tablet 12.5 mg (12.5 mg Oral Given 9/9/23 2240)        ED Course:    ED Course as of 09/10/23 0650   Sat Sep 09, 2023   2130 My interpretation of EKG: Sinus rhythm 97, occasional PVC, no acute ischemia [JS]   2243 The patient is having frequent PVCs seen on cardiac monitoring and on ECG.  She has no evidence of ischemia.  Her electrolytes are within normal limits.  We will trial a low-dose of metoprolol at 12.5 mg twice daily.  Patient will follow-up with cardiology.  We discussed reduction in stimulants and caffeine.  We discussed return precautions including worsening symptoms or any additional concerns. [JS]   Sun Sep 10, 2023   0001 Patient feels symptomatically improved after treatment with 12.5 mg of metoprolol.  Her heart rate has slowed to the 60s to 70s and there is no longer ectopy seen on the monitor.  I have placed an ambulatory referral for cardiology.  She feels comfortable plan for discharge home. [JS]   0001 HEART SCORE  History: Slightly Suspicious (0)  ECG: Normal (0)  Age: 45-64 years old (1)  Risk factors: 1-2 Risk Factors (1)  Troponin: normal (0)    This patient's HEART score is 1.    According to the HEART Score Study: Score (Risk of adverse cardiac event in the next 14 days)  Scores 0-3: (0.9-1.7%) In the HEART Score study, these patients were discharged home.  Scores 4-6: (12-16.6%)  In the HEART Score study, these patients were admitted to the hospital.  Scores =7: (50-65%) In the HEART Score study, these patients were candidates for early invasive measures.   Final disposition is based on individual provider judgement and current clinical situation.     [JS]      ED Course User Index  [JS] Pedro Garner MD       Labs:    Lab Results (last 24 hours)       Procedure Component Value Units Date/Time    High Sensitivity Troponin T [[id number]]   (Normal) Collected: 09/09/23 2101    Specimen: Blood from Arm, Right Updated: 09/09/23 2134     HS Troponin T <6 ng/L     Narrative:      High Sensitive Troponin T Reference Range:  <10.0 ng/L- Negative Female for AMI  <15.0 ng/L- Negative Male for AMI  >=10 - Abnormal Female indicating possible myocardial injury.  >=15 - Abnormal Male indicating possible myocardial injury.   Clinicians would have to utilize clinical acumen, EKG, Troponin, and serial changes to determine if it is an Acute Myocardial Infarction or myocardial injury due to an underlying chronic condition.         CBC & Differential [[id number]]  (Normal) Collected: 09/09/23 2101    Specimen: Blood from Arm, Right Updated: 09/09/23 2110    Narrative:      The following orders were created for panel order CBC & Differential.  Procedure                               Abnormality         Status                     ---------                               -----------         ------                     CBC Auto Differential[[id number]]        Normal              Final result                 Please view results for these tests on the individual orders.    Comprehensive Metabolic Panel [[id number]]  (Abnormal) Collected: 09/09/23 2101    Specimen: Blood from Arm, Right Updated: 09/09/23 2134     Glucose 101 mg/dL      BUN 14 mg/dL      Creatinine 1.08 mg/dL      Sodium 141 mmol/L      Potassium 4.1 mmol/L      Chloride 103 mmol/L      CO2 27.6 mmol/L      Calcium 9.6 mg/dL      Total Protein 7.5 g/dL      Albumin 4.7 g/dL      ALT (SGPT) 12 U/L      AST (SGOT) 17 U/L      Alkaline Phosphatase 71 U/L      Total Bilirubin 0.4 mg/dL      Globulin 2.8 gm/dL      A/G Ratio 1.7 g/dL      BUN/Creatinine Ratio 13.0     Anion Gap 10.4 mmol/L      eGFR 61.2 mL/min/1.73     Narrative:      GFR Normal >60  Chronic Kidney Disease <60  Kidney Failure <15      Lipase [[id number]]  (Normal) Collected: 09/09/23 2101    Specimen: Blood from Arm, Right Updated: 09/09/23 2134      Lipase 28 U/L     BNP [[id number]]  (Normal) Collected: 09/09/23 2101    Specimen: Blood from Arm, Right Updated: 09/09/23 2131     proBNP 72.6 pg/mL     Narrative:      Among patients with dyspnea, NT-proBNP is highly sensitive for the detection of acute congestive heart failure. In addition NT-proBNP of <300 pg/ml effectively rules out acute congestive heart failure with 99% negative predictive value.      Magnesium [[id number]]  (Normal) Collected: 09/09/23 2101    Specimen: Blood from Arm, Right Updated: 09/09/23 2134     Magnesium 2.1 mg/dL     CBC Auto Differential [[id number]]  (Normal) Collected: 09/09/23 2101    Specimen: Blood from Arm, Right Updated: 09/09/23 2110     WBC 4.59 10*3/mm3      RBC 4.23 10*6/mm3      Hemoglobin 12.6 g/dL      Hematocrit 37.0 %      MCV 87.5 fL      MCH 29.8 pg      MCHC 34.1 g/dL      RDW 13.7 %      RDW-SD 43.4 fl      MPV 9.8 fL      Platelets 234 10*3/mm3      Neutrophil % 46.2 %      Lymphocyte % 41.6 %      Monocyte % 9.2 %      Eosinophil % 1.5 %      Basophil % 1.3 %      Immature Grans % 0.2 %      Neutrophils, Absolute 2.12 10*3/mm3      Lymphocytes, Absolute 1.91 10*3/mm3      Monocytes, Absolute 0.42 10*3/mm3      Eosinophils, Absolute 0.07 10*3/mm3      Basophils, Absolute 0.06 10*3/mm3      Immature Grans, Absolute 0.01 10*3/mm3      nRBC 0.0 /100 WBC     High Sensitivity Troponin T 2Hr [[id number]] Collected: 09/09/23 2255    Specimen: Blood Updated: 09/09/23 2324     HS Troponin T <6 ng/L      Troponin T Delta --     Comment: Unable to calculate.       Narrative:      High Sensitive Troponin T Reference Range:  <10.0 ng/L- Negative Female for AMI  <15.0 ng/L- Negative Male for AMI  >=10 - Abnormal Female indicating possible myocardial injury.  >=15 - Abnormal Male indicating possible myocardial injury.   Clinicians would have to utilize clinical acumen, EKG, Troponin, and serial changes to determine if it is an Acute Myocardial Infarction or myocardial injury due  to an underlying chronic condition.                  Imaging:    XR Chest 1 View    Result Date: 9/9/2023  PROCEDURE: XR CHEST 1 VW  COMPARISON: [facility], CT, CT CHEST W CONTRAST DIAGNOSTIC, 4/06/2022, 13:02.  [facility], CR, CHEST PA/AP & LAT 2V, 9/23/2018, 13:06.  INDICATIONS: Chest Pain  FINDINGS:  No new consolidations or pleural effusions are observed. The cardiac silhouette and mediastinum are unchanged. No definitive acute osseous abnormalities are seen on this single view.        1. No change from the previous study with no evidence for acute cardiopulmonary process.         HANSA HICKS MD       Electronically Signed and Approved By: HANSA HICKS MD on 9/09/2023 at 21:30                Differential Diagnosis and Discussion:    Chest Pain:  Based on the patient's signs and symptoms, I considered aortic dissection, myocardial infaction, pulmonary embolism, cardiac tamponade, pericarditis, pneumothorax, musculoskeletal chest pain and other differential diagnosis as an etiology of the patient's chest pain.   Palpitations: Differential diagnosis includes but is not limited to anxiety, atrioventricular blocks, mitral valve disease, hypoxia, coronary artery disease, hypokalemia, anemia, fever, COPD, congestive heart failure, pericarditis, Ligia-Parkinson-White syndrome, pulmonary embolism, SVT, atrial fibrillation, atrial flutter, sinus tachycardia, thyrotoxicosis, and pheochromocytoma.    All labs were reviewed and interpreted by me.  All X-rays impressions were independently interpreted by me.  EKG was interpreted by me.    MDM     Amount and/or Complexity of Data Reviewed  Tests in the medicine section of CPT®: reviewed             Patient Care Considerations:    PERC: I used the PERC score to risk stratify the patient for PE and a CT of the chest was considered but ultimately not indicated in today's visit.      Consultants/Shared Management Plan:    None    Social  Determinants of Health:    Patient has presented with family members who are responsible, reliable and will ensure follow up care.      Disposition and Care Coordination:    Discharged: The patient is suitable and stable for discharge with no need for consideration of observation or admission.    I have explained the patient´s condition, diagnoses and treatment plan based on the information available to me at this time. I have answered questions and addressed any concerns. The patient has a good  understanding of the patient´s diagnosis, condition, and treatment plan as can be expected at this point. The vital signs have been stable. The patient´s condition is stable and appropriate for discharge from the emergency department.      The patient will pursue further outpatient evaluation with the primary care physician or other designated or consulting physician as outlined in the discharge instructions. They are agreeable to this plan of care and follow-up instructions have been explained in detail. The patient has received these instructions in written format and have expressed an understanding of the discharge instructions. The patient is aware that any significant change in condition or worsening of symptoms should prompt an immediate return to this or the closest emergency department or call to 911.  I have explained discharge medications and the need for follow up with the patient/caretakers. This was also printed in the discharge instructions. Patient was discharged with the following medications and follow up:      Medication List        New Prescriptions      metoprolol tartrate 25 MG tablet  Commonly known as: LOPRESSOR  Take 0.5 tablets by mouth 2 (Two) Times a Day for 14 days.               Where to Get Your Medications        These medications were sent to [id number] - [place], [place] - [address] AT [facility] [address] /[facility] & [place] - [phone] [facility] [phone]   [address]  [place] [address]      Phone: [phone]   metoprolol tartrate 25 MG tablet      Pedro Florez MD  [address] [place] [address] [phone]    Schedule an appointment as soon as possible for a visit          Final diagnoses:   Chest discomfort   Frequent PVCs        ED Disposition       ED Disposition   Discharge    Condition   Stable    Comment   --               This medical record created using voice recognition software.             Pedro Garner MD  09/10/23 0667

## 2023-10-06 ENCOUNTER — OFFICE VISIT (OUTPATIENT)
Dept: FAMILY MEDICINE CLINIC | Facility: CLINIC | Age: 53
End: 2023-10-06
Payer: OTHER GOVERNMENT

## 2023-10-06 VITALS
DIASTOLIC BLOOD PRESSURE: 80 MMHG | HEIGHT: 67 IN | SYSTOLIC BLOOD PRESSURE: 140 MMHG | TEMPERATURE: 97.1 F | BODY MASS INDEX: 27.25 KG/M2 | OXYGEN SATURATION: 98 % | HEART RATE: 76 BPM | WEIGHT: 173.6 LBS

## 2023-10-06 DIAGNOSIS — M79.672 LEFT FOOT PAIN: ICD-10-CM

## 2023-10-06 LAB — URATE SERPL-MCNC: 5.7 MG/DL (ref 2.4–5.7)

## 2023-10-06 PROCEDURE — 84550 ASSAY OF BLOOD/URIC ACID: CPT | Performed by: FAMILY MEDICINE

## 2023-10-06 RX ORDER — MELOXICAM 15 MG/1
15 TABLET ORAL DAILY
Qty: 10 TABLET | Refills: 0 | Status: SHIPPED | OUTPATIENT
Start: 2023-10-06

## 2023-10-06 NOTE — PROGRESS NOTES
Venipuncture Blood Specimen Collection  Venipuncture performed in left arm  by Danae Cee with good hemostasis. Patient tolerated the procedure well without complications.   10/06/23   Danae Cee

## 2023-10-06 NOTE — PROGRESS NOTES
Chief Complaint  Arthritis    Subjective          Nora Walker presents to [facility]  History of Present Illness  Pt has had pain that began in left MTP #1 joint and has moved through the foot and now multiple joints hurt, no redness or warmth- no recent injury- pt taking tylenol for pain- not much relief  Pt will see rheumatology in 1 week           Objective   Allergies   Allergen Reactions    Adhesive Tape Rash     RASH FROM SOME TAPES AFTER ON OVER 1 DAY       There is no immunization history on file for this patient.  Past Medical History:   Diagnosis Date    Allergic rhinitis     Arthritis of back 2002    Arthritis of neck 2002    Bursitis of hip 2020    Fracture of wrist 2008    Fracture, foot 2021    Frozen shoulder 2002    GERD (gastroesophageal reflux disease)     Hip arthrosis 2020    Low back pain 2012    Low back strain 2009    Fusion    Neuroma of foot     Possible    Periarthritis of shoulder 2/02    Rheumatoid arthritis     Wrist sprain       Past Surgical History:   Procedure Laterality Date    BACK SURGERY      low back disc    COLONOSCOPY  1998    FOOT SURGERY      FRACTURE SURGERY  2021    R foot 2nd toe    HAND SURGERY  2010    R wrist    HYSTERECTOMY      SPINE SURGERY  [id number]    Multi    TRIGGER POINT INJECTION      WRIST SURGERY        Social History     Socioeconomic History    Marital status:    Tobacco Use    Smoking status: Never    Smokeless tobacco: Never   Vaping Use    Vaping Use: Never used   Substance and Sexual Activity    Alcohol use: Not Currently    Drug use: Never    Sexual activity: Yes     Partners: Male     Birth control/protection: Post-menopausal        Current Outpatient Medications:     cyclobenzaprine (FLEXERIL) 10 MG tablet, Take 1 tablet by mouth Daily., Disp: , Rfl:     Rinvoq 15 MG tablet sustained-release 24 hour, , Disp: , Rfl:     meloxicam (Mobic) 15 MG tablet, Take 1 tablet by mouth Daily., Disp: 10 tablet, Rfl:  "0   Family History   Problem Relation Age of Onset    Depression Mother     Osteoporosis Mother     Alcohol abuse Mother           Vital Signs:   Vitals:    10/06/23 1128   BP: 140/80   BP Location: Left arm   Patient Position: Sitting   Cuff Size: Adult   Pulse: 76   Temp: 97.1 °F (36.2 °C)   SpO2: 98%   Weight: 78.7 kg (173 lb 9.6 oz)   Height: 170.2 cm (67\")       Review of Systems   Physical Exam  Vitals reviewed.   Constitutional:       Appearance: Normal appearance. She is well-developed.   HENT:      Head: Normocephalic and atraumatic.      Right Ear: External ear normal.      Left Ear: External ear normal.      Mouth/Throat:      Pharynx: No oropharyngeal exudate.   Eyes:      Conjunctiva/sclera: Conjunctivae normal.      Pupils: Pupils are equal, round, and reactive to light.   Cardiovascular:      Rate and Rhythm: Normal rate and regular rhythm.      Pulses: Normal pulses.      Heart sounds: Normal heart sounds. No murmur heard.    No friction rub. No gallop.   Pulmonary:      Effort: Pulmonary effort is normal.      Breath sounds: Normal breath sounds. No wheezing or rhonchi.   Abdominal:      General: Abdomen is flat. Bowel sounds are normal. There is no distension.      Palpations: Abdomen is soft. There is no mass.      Tenderness: There is no abdominal tenderness. There is no guarding or rebound.      Hernia: No hernia is present.   Musculoskeletal:         General: Normal range of motion.      Comments: Generalized left foot tenderness, no redness, warmth, swelling, or bruising.   Skin:     General: Skin is warm and dry.      Capillary Refill: Capillary refill takes less than 2 seconds.   Neurological:      General: No focal deficit present.      Mental Status: She is alert and oriented to person, place, and time.      Cranial Nerves: No cranial nerve deficit.   Psychiatric:         Mood and Affect: Mood and affect normal.         Behavior: Behavior normal.         Thought Content: Thought content " normal.         Judgment: Judgment normal.      Result Review :   The following data was reviewed by: Zaid Renteria MD on 10/06/2023:    Data reviewed : Radiologic studies I viewed and interpreted 3 views left foot x-rays:no fxs.           Assessment and Plan    Diagnoses and all orders for this visit:    1. BMI 27.0-27.9,adult (Primary)    2. Left foot pain  -     XR Foot 3+ View Left (In Office)  -     Uric Acid  -     meloxicam (Mobic) 15 MG tablet; Take 1 tablet by mouth Daily.  Dispense: 10 tablet; Refill: 0            Follow Up   Return if symptoms worsen or fail to improve.  Patient was given instructions and counseling regarding her condition or for health maintenance advice. Please see specific information pulled into the AVS if appropriate.

## 2023-10-13 ENCOUNTER — OFFICE VISIT (OUTPATIENT)
Dept: CARDIOLOGY | Facility: CLINIC | Age: 53
End: 2023-10-13
Payer: OTHER GOVERNMENT

## 2023-10-13 VITALS
SYSTOLIC BLOOD PRESSURE: 141 MMHG | DIASTOLIC BLOOD PRESSURE: 92 MMHG | WEIGHT: 171 LBS | HEIGHT: 67 IN | HEART RATE: 79 BPM | BODY MASS INDEX: 26.84 KG/M2

## 2023-10-13 DIAGNOSIS — R00.2 PALPITATIONS: Primary | ICD-10-CM

## 2023-10-13 PROCEDURE — 99203 OFFICE O/P NEW LOW 30 MIN: CPT | Performed by: SPECIALIST

## 2023-10-13 NOTE — PROGRESS NOTES
[facility]   Cardiology Consult Note    Patient Name: Nora Walker  : 1970  Referring Physician: Pedro Garner MD  Subjective   Subjective     Reason for Consult/ Chief Complaint:   Chief Complaint   Patient presents with    Chest Pain    Palpitations       HPI:  Nora Walker is a 53 y.o. female with history of palpitations on and off for several years.  Recently her palpitations have increased after being started on any new medications.  Went to the emergency room evaluated and discharged.  Was started on Toprol which she does not want to continue in view of making her tired.  No exertional angina.  She has some nonspecific chest pain associated with her palpitations.    Review of Systems:    Constitutional no fever,  no weight loss   Skin no rash   Otolaryngeal no difficulty swallowing   Cardiovascular See HPI   Pulmonary no cough, no sputum production   Gastrointestinal no constipation, no diarrhea   Genitourinary no dysuria, no hematuria   Hematologic no easy bruisability, no abnormal bleeding   Musculoskeletal no muscle pain   Neurologic no dizziness, no falls       Personal History     Past Medical History:  Past Medical History:   Diagnosis Date    Allergic rhinitis     Arthritis of back     Arthritis of neck     Bursitis of hip 2020    Fracture of wrist 2008    Fracture, foot     Frozen shoulder 2002    GERD (gastroesophageal reflux disease)     Hip arthrosis 2020    Low back pain     Low back strain 2009    Fusion    Neuroma of foot     Possible    Periarthritis of shoulder     Rheumatoid arthritis     Wrist sprain        Family History:   Family History   Problem Relation Age of Onset    Depression Mother     Osteoporosis Mother     Alcohol abuse Mother        Social History:  reports that she has never smoked. She has never used smokeless tobacco. She reports that she does not currently use alcohol. She reports that she does not use drugs.    Home  Medications:  Upadacitinib ER, cyclobenzaprine, and meloxicam    Allergies:  Allergies   Allergen Reactions    Adhesive Tape Rash     RASH FROM SOME TAPES AFTER ON OVER 1 DAY       Objective    Objective     Vitals:   Heart Rate:  [79] 79  BP: (141)/(92) 141/92  Body mass index is 26.78 kg/mý.  PHYSICAL EXAM:    General Appearance:   well developed  well nourished  HENT:   oropharynx moist  lips not cyanotic  Neck:  thyroid not enlarged  supple  Respiratory:  no respiratory distress  normal breath sounds  no rales  Cardiovascular:  no jugular venous distention  regular rhythm  apical impulse normal  S1 normal, S2 normal  no S3, no S4   no murmur  no rub, no thrill  carotid pulses normal; no bruit  pedal pulses normal  lower extremity edema: none    Skin:   warm, dry  Psychiatric:  judgement and insight appropriate  normal mood and affect    RESULTS:    EKG reviewed by me and shows sinus rhythm, poor R wave progression, PACs/PVCs.       Result Review    Result Review:  I have personally reviewed the available results:  [x]  Laboratory  [x]  EKG/Telemetry   [x]  Cardiology/Vascular   [x] Medications  [x]  Old records  Lab Results   Component Value Date    CHOL 271 (H) 08/15/2023    CHOL 201 (H) 07/25/2022     Lab Results   Component Value Date    TRIG 205 (H) 08/15/2023    TRIG 118 07/25/2022    TRIG 163 (H) 04/15/2020     Lab Results   Component Value Date    HDL 52 08/15/2023    HDL 53 07/25/2022    HDL 47 04/15/2020     Lab Results   Component Value Date     (H) 08/15/2023     (H) 07/25/2022     (H) 04/15/2020     Lab Results   Component Value Date    VLDL 38 08/15/2023    VLDL 21 07/25/2022    VLDL 33 04/15/2020         Procedures     Impression/Plan  1. Palpitations/PACs PVCs: 24-hour Holter monitoring.  Echocardiogram.  Does not want to be started on any medications at present.  Low caffeine diet advised.  2.  Precordial atypical chest pain: Stress echocardiogram.  3.  Hyperlipidemia:  Low-fat diet and exercise.        Electronically signed by Gaurang Goins MD, 10/13/23, 11:05 AM EDT.

## 2023-10-30 ENCOUNTER — HOSPITAL ENCOUNTER (OUTPATIENT)
Dept: CARDIOLOGY | Facility: HOSPITAL | Age: 53
Discharge: HOME | End: 2023-10-30
Admitting: SPECIALIST
Payer: OTHER GOVERNMENT

## 2023-10-30 DIAGNOSIS — R00.2 PALPITATIONS: ICD-10-CM

## 2023-10-30 PROCEDURE — 93018 CV STRESS TEST I&R ONLY: CPT | Performed by: INTERNAL MEDICINE

## 2023-10-30 PROCEDURE — 93325 DOPPLER ECHO COLOR FLOW MAPG: CPT

## 2023-10-30 PROCEDURE — 93325 DOPPLER ECHO COLOR FLOW MAPG: CPT | Performed by: INTERNAL MEDICINE

## 2023-10-30 PROCEDURE — 93350 STRESS TTE ONLY: CPT

## 2023-10-30 PROCEDURE — 93350 STRESS TTE ONLY: CPT | Performed by: INTERNAL MEDICINE

## 2023-10-30 PROCEDURE — 93017 CV STRESS TEST TRACING ONLY: CPT

## 2023-10-30 PROCEDURE — 93016 CV STRESS TEST SUPVJ ONLY: CPT | Performed by: NURSE PRACTITIONER

## 2023-10-30 PROCEDURE — 93320 DOPPLER ECHO COMPLETE: CPT | Performed by: INTERNAL MEDICINE

## 2023-10-30 PROCEDURE — 93320 DOPPLER ECHO COMPLETE: CPT

## 2023-10-31 LAB
BH CV ECHO MEAS - ACS: 0.6 CM
BH CV ECHO MEAS - AI P1/2T: 308 MSEC
BH CV ECHO MEAS - AO MAX PG: 7 MMHG
BH CV ECHO MEAS - AO MEAN PG: 3 MMHG
BH CV ECHO MEAS - AO ROOT DIAM: 3 CM
BH CV ECHO MEAS - AO V2 MAX: 129 CM/SEC
BH CV ECHO MEAS - AO V2 VTI: 24.6 CM
BH CV ECHO MEAS - EDV(CUBED): 53.2 ML
BH CV ECHO MEAS - EDV(MOD-SP2): 70.6 ML
BH CV ECHO MEAS - EDV(MOD-SP4): 85 ML
BH CV ECHO MEAS - EF(MOD-BP): 55.6 %
BH CV ECHO MEAS - EF(MOD-SP2): 56.1 %
BH CV ECHO MEAS - EF(MOD-SP4): 55.3 %
BH CV ECHO MEAS - ESV(CUBED): 19.7 ML
BH CV ECHO MEAS - ESV(MOD-SP2): 31 ML
BH CV ECHO MEAS - ESV(MOD-SP4): 38 ML
BH CV ECHO MEAS - FS: 28.2 %
BH CV ECHO MEAS - IVS/LVPW: 1.01 CM
BH CV ECHO MEAS - IVSD: 0.99 CM
BH CV ECHO MEAS - LA DIMENSION: 3.1 CM
BH CV ECHO MEAS - LAT PEAK E' VEL: 8.3 CM/SEC
BH CV ECHO MEAS - LV DIASTOLIC VOL/BSA (35-75): 45 CM2
BH CV ECHO MEAS - LV MASS(C)D: 112.9 GRAMS
BH CV ECHO MEAS - LV SYSTOLIC VOL/BSA (12-30): 20.1 CM2
BH CV ECHO MEAS - LVIDD: 3.8 CM
BH CV ECHO MEAS - LVIDS: 2.7 CM
BH CV ECHO MEAS - LVOT AREA: 3.1 CM2
BH CV ECHO MEAS - LVOT DIAM: 2 CM
BH CV ECHO MEAS - LVPWD: 0.98 CM
BH CV ECHO MEAS - MED PEAK E' VEL: 6.4 CM/SEC
BH CV ECHO MEAS - MV A MAX VEL: 79.7 CM/SEC
BH CV ECHO MEAS - MV DEC TIME: 0.24 SEC
BH CV ECHO MEAS - MV E MAX VEL: 58.3 CM/SEC
BH CV ECHO MEAS - MV E/A: 0.73
BH CV ECHO MEAS - RVDD: 2.5 CM
BH CV ECHO MEAS - SI(MOD-SP2): 21 ML/M2
BH CV ECHO MEAS - SI(MOD-SP4): 24.9 ML/M2
BH CV ECHO MEAS - SV(MOD-SP2): 39.6 ML
BH CV ECHO MEAS - SV(MOD-SP4): 47 ML
BH CV ECHO MEAS - TAPSE (>1.6): 2.32 CM
BH CV ECHO MEASUREMENTS AVERAGE E/E' RATIO: 7.93
BH CV IMMEDIATE POST RECOVERY TECH DATA SYMPTOMS: NORMAL
BH CV IMMEDIATE POST TECH DATA BLOOD PRESSURE: NORMAL MMHG
BH CV IMMEDIATE POST TECH DATA HEART RATE: 154 BPM
BH CV IMMEDIATE POST TECH DATA OXYGEN SATS: 99 %
BH CV NINE MINUTE RECOVERY TECH DATA BLOOD PRESSURE: NORMAL MMHG
BH CV NINE MINUTE RECOVERY TECH DATA HEART RATE: 96 BPM
BH CV NINE MINUTE RECOVERY TECH DATA OXYGEN SATURATION: 99 %
BH CV NINE MINUTE RECOVERY TECH DATA SYMPTOMS: NORMAL
BH CV SIX MINUTE RECOVERY TECH DATA BLOOD PRESSURE: NORMAL
BH CV SIX MINUTE RECOVERY TECH DATA HEART RATE: 98 BPM
BH CV SIX MINUTE RECOVERY TECH DATA OXYGEN SATURATION: 99 %
BH CV STRESS BP STAGE 1: NORMAL
BH CV STRESS BP STAGE 2: NORMAL
BH CV STRESS BP STAGE 3: NORMAL
BH CV STRESS DURATION MIN STAGE 1: 3
BH CV STRESS DURATION MIN STAGE 2: 3
BH CV STRESS DURATION MIN STAGE 3: 3
BH CV STRESS DURATION SEC STAGE 1: 0
BH CV STRESS DURATION SEC STAGE 2: 0
BH CV STRESS DURATION SEC STAGE 3: 0
BH CV STRESS GRADE STAGE 1: 10
BH CV STRESS GRADE STAGE 2: 12
BH CV STRESS GRADE STAGE 3: 14
BH CV STRESS HR STAGE 1: 117
BH CV STRESS HR STAGE 2: 138
BH CV STRESS HR STAGE 3: 154
BH CV STRESS METS STAGE 1: 5
BH CV STRESS METS STAGE 2: 7.5
BH CV STRESS METS STAGE 3: 10
BH CV STRESS PROTOCOL 1: NORMAL
BH CV STRESS RECOVERY BP: 97 MMHG
BH CV STRESS RECOVERY HR: 2 BPM
BH CV STRESS RECOVERY O2: 99 %
BH CV STRESS SPEED STAGE 1: 1.7
BH CV STRESS SPEED STAGE 2: 2.5
BH CV STRESS SPEED STAGE 3: 3.4
BH CV STRESS STAGE 1: 1
BH CV STRESS STAGE 2: 2
BH CV STRESS STAGE 3: 3
BH CV THREE MINUTE POST TECH DATA BLOOD PRESSURE: NORMAL MMHG
BH CV THREE MINUTE POST TECH DATA HEART RATE: 115 BPM
BH CV THREE MINUTE POST TECH DATA OXYGEN SATURATION: 99 %
BH CV THREE MINUTE RECOVERY TECH DATA SYMPTOM: NORMAL
LEFT ATRIUM VOLUME INDEX: 20.2 ML/M2
MAXIMAL PREDICTED HEART RATE: 166 BPM
PERCENT MAX PREDICTED HR: 93.98 %
STRESS BASELINE BP: NORMAL MMHG
STRESS BASELINE HR: 86 BPM
STRESS O2 SAT REST: 97 %
STRESS PERCENT HR: 111 %
STRESS POST ESTIMATED WORKLOAD: 10.2 METS
STRESS POST EXERCISE DUR MIN: 9 MIN
STRESS POST EXERCISE DUR SEC: 0 SEC
STRESS POST O2 SAT PEAK: 99 %
STRESS POST PEAK BP: NORMAL MMHG
STRESS POST PEAK HR: 156 BPM
STRESS TARGET HR: 141 BPM

## 2023-11-01 ENCOUNTER — TELEPHONE (OUTPATIENT)
Dept: CARDIOLOGY | Facility: CLINIC | Age: 53
End: 2023-11-01
Payer: OTHER GOVERNMENT

## 2023-11-01 RX ORDER — MAGNESIUM OXIDE 400 MG/1
400 TABLET ORAL DAILY
Qty: 90 TABLET | Refills: 3 | Status: SHIPPED | OUTPATIENT
Start: 2023-11-01

## 2023-11-01 NOTE — TELEPHONE ENCOUNTER
----- Message from JUDE Devine sent at 11/1/2023  7:38 AM EDT -----  Notify pt echo/stress result:  ·  Mildly elevated resting blood pressure with normal blood pressure response to exercise.  ·  Frequent PACs were noted. Avoid caffeine products. Recommend magnesium oxide 400 mg daily.   ·  No ischemic ST-T changes were noted at peak stress.  ·  Normal resting LVEF with normal augmentation to stress.  ·  Resting LVEF is 55%.  Postexercise, LVEF was 65%.  ·  Mild aortic valve regurgitation was noted, murmur, will monitor with repeat echo in 2-3 years.  Continue with holter on 11/7/23

## 2024-01-22 DIAGNOSIS — M79.672 LEFT FOOT PAIN: Primary | ICD-10-CM

## 2024-04-23 DIAGNOSIS — Z79.4 ENCOUNTER FOR LONG-TERM (CURRENT) USE OF INSULIN: Primary | ICD-10-CM

## 2024-07-17 ENCOUNTER — LAB (OUTPATIENT)
Dept: LAB | Facility: HOSPITAL | Age: 54
End: 2024-07-17
Payer: OTHER GOVERNMENT

## 2024-07-17 DIAGNOSIS — Z79.4 ENCOUNTER FOR LONG-TERM (CURRENT) USE OF INSULIN: ICD-10-CM

## 2024-07-17 LAB
ALBUMIN SERPL-MCNC: 4.7 G/DL (ref 3.5–5.2)
ALT SERPL W P-5'-P-CCNC: 17 U/L (ref 1–33)
AST SERPL-CCNC: 24 U/L (ref 1–32)
BASOPHILS # BLD AUTO: 0.05 10*3/MM3 (ref 0–0.2)
BASOPHILS NFR BLD AUTO: 1.1 % (ref 0–1.5)
CRP SERPL-MCNC: <0.3 MG/DL (ref 0–0.5)
DEPRECATED RDW RBC AUTO: 42.1 FL (ref 37–54)
EOSINOPHIL # BLD AUTO: 0.06 10*3/MM3 (ref 0–0.4)
EOSINOPHIL NFR BLD AUTO: 1.3 % (ref 0.3–6.2)
ERYTHROCYTE [DISTWIDTH] IN BLOOD BY AUTOMATED COUNT: 12.9 % (ref 12.3–15.4)
HCT VFR BLD AUTO: 39.7 % (ref 34–46.6)
HGB BLD-MCNC: 12.8 G/DL (ref 12–15.9)
IMM GRANULOCYTES # BLD AUTO: 0.02 10*3/MM3 (ref 0–0.05)
IMM GRANULOCYTES NFR BLD AUTO: 0.4 % (ref 0–0.5)
LYMPHOCYTES # BLD AUTO: 1.36 10*3/MM3 (ref 0.7–3.1)
LYMPHOCYTES NFR BLD AUTO: 29.8 % (ref 19.6–45.3)
MCH RBC QN AUTO: 28.8 PG (ref 26.6–33)
MCHC RBC AUTO-ENTMCNC: 32.2 G/DL (ref 31.5–35.7)
MCV RBC AUTO: 89.4 FL (ref 79–97)
MONOCYTES # BLD AUTO: 0.46 10*3/MM3 (ref 0.1–0.9)
MONOCYTES NFR BLD AUTO: 10.1 % (ref 5–12)
NEUTROPHILS NFR BLD AUTO: 2.61 10*3/MM3 (ref 1.7–7)
NEUTROPHILS NFR BLD AUTO: 57.3 % (ref 42.7–76)
NRBC BLD AUTO-RTO: 0 /100 WBC (ref 0–0.2)
PLATELET # BLD AUTO: 277 10*3/MM3 (ref 140–450)
PMV BLD AUTO: 10.5 FL (ref 6–12)
RBC # BLD AUTO: 4.44 10*6/MM3 (ref 3.77–5.28)
WBC NRBC COR # BLD AUTO: 4.56 10*3/MM3 (ref 3.4–10.8)

## 2024-07-17 PROCEDURE — 82040 ASSAY OF SERUM ALBUMIN: CPT

## 2024-07-17 PROCEDURE — 85025 COMPLETE CBC W/AUTO DIFF WBC: CPT

## 2024-07-17 PROCEDURE — 84450 TRANSFERASE (AST) (SGOT): CPT

## 2024-07-17 PROCEDURE — 84460 ALANINE AMINO (ALT) (SGPT): CPT

## 2024-07-17 PROCEDURE — 86140 C-REACTIVE PROTEIN: CPT

## 2024-07-17 PROCEDURE — 36415 COLL VENOUS BLD VENIPUNCTURE: CPT

## 2024-07-23 ENCOUNTER — TELEPHONE (OUTPATIENT)
Dept: FAMILY MEDICINE CLINIC | Facility: CLINIC | Age: 54
End: 2024-07-23
Payer: OTHER GOVERNMENT

## 2024-07-23 NOTE — TELEPHONE ENCOUNTER
Caller: Nora Walker    Relationship: Self    Best call back number: [phone]     What is the best time to reach you: ANYTIME     Who are you requesting to speak with (clinical staff, provider,  specific staff member): CLINICAL       What was the call regarding: PATIENT IS CALLING REQUESTING FOR A REFERRAL, PRIOR REFERRAL .       BRIT TEJADA MD  [facility]  [address] [place] [address], [address] [place] [address]  [phone]

## 2024-10-02 ENCOUNTER — OFFICE VISIT (OUTPATIENT)
Dept: FAMILY MEDICINE CLINIC | Facility: CLINIC | Age: 54
End: 2024-10-02
Payer: OTHER GOVERNMENT

## 2024-10-02 VITALS
HEIGHT: 67 IN | TEMPERATURE: 98.3 F | DIASTOLIC BLOOD PRESSURE: 84 MMHG | OXYGEN SATURATION: 99 % | WEIGHT: 169 LBS | BODY MASS INDEX: 26.53 KG/M2 | SYSTOLIC BLOOD PRESSURE: 158 MMHG | HEART RATE: 107 BPM

## 2024-10-02 DIAGNOSIS — R10.9 LEFT SIDED ABDOMINAL PAIN: Primary | ICD-10-CM

## 2024-10-02 DIAGNOSIS — R10.13 POSTPRANDIAL EPIGASTRIC PAIN: ICD-10-CM

## 2024-10-02 PROBLEM — M25.431 WRIST SWELLING, RIGHT: Status: RESOLVED | Noted: 2022-04-28 | Resolved: 2024-10-02

## 2024-10-02 PROCEDURE — 99214 OFFICE O/P EST MOD 30 MIN: CPT | Performed by: FAMILY MEDICINE

## 2024-10-02 RX ORDER — LUBIPROSTONE 8 UG/1
8 CAPSULE ORAL 2 TIMES DAILY WITH MEALS
Qty: 14 CAPSULE | Refills: 0 | Status: SHIPPED | OUTPATIENT
Start: 2024-10-02 | End: 2024-10-09

## 2024-10-02 RX ORDER — HYDROCODONE BITARTRATE AND ACETAMINOPHEN 5; 325 MG/1; MG/1
1 TABLET ORAL EVERY 8 HOURS PRN
Qty: 9 TABLET | Refills: 0 | Status: SHIPPED | OUTPATIENT
Start: 2024-10-02

## 2024-10-02 NOTE — PROGRESS NOTES
"Chief Complaint    Abdominal Pain (LLQ abdominal pain x 4 weeks (very severe today).  Having to strain to pass gas, having difficulty with stools (are pencil thin when they do produce).  Used a suppository today.  Has been eliminating items from her diet to see if it helps and using OTC medication.  Has noticed the pain increases w/ drinking soda/tea (which she has had very little of, but did eat a burger and have a soda for dinner last night).)    Subjective      Nora Walker presents to [facility]    Abdominal Pain  This is a new problem. The current episode started 1 to 4 weeks ago. The onset quality is gradual. Episode frequency: Initially intermittent, now constant. The problem has been worse (Patient reports significant pain primarily affecting the right side of her abdomen). The pain is located in the LLQ and LUQ. The pain is at a severity of 10/10. The pain is severe. The quality of the pain is aching. Associated symptoms include constipation. Pertinent negatives include no fever, nausea or vomiting. Associated symptoms comments: Patient admits to pencil-like stool.  She admits to history of constipation.  She also notes that she has not been passing gas.. The pain is relieved by Nothing. Treatments tried: No relief with over-the-counter treatment for constipation.     Objective     Vital Signs:     /84 (BP Location: Left arm, Patient Position: Sitting, Cuff Size: Adult)   Pulse 107   Temp 98.3 °F (36.8 °C) (Temporal)   Ht 170.2 cm (67\")   Wt 76.7 kg (169 lb)   SpO2 99%   BMI 26.47 kg/m²       Physical Exam  Vitals reviewed.   Constitutional:       General: She is not in acute distress.     Appearance: Normal appearance. She is well-developed.   HENT:      Head: Normocephalic and atraumatic.      Right Ear: Hearing and external ear normal.      Left Ear: Hearing and external ear normal.      Nose: Nose normal.   Eyes:      General: Lids are normal.         " Right eye: No discharge.         Left eye: No discharge.      Conjunctiva/sclera: Conjunctivae normal.   Pulmonary:      Effort: Pulmonary effort is normal.   Abdominal:      Tenderness:  in the left upper quadrant and left lower quadrant   Musculoskeletal:         General: No swelling.      Cervical back: Neck supple.   Skin:     Coloration: Skin is not jaundiced.      Findings: No erythema.   Neurological:      Mental Status: She is alert. Mental status is at baseline.   Psychiatric:         Mood and Affect: Mood and affect normal.         Thought Content: Thought content normal.     Assessment and Plan     Diagnoses and all orders for this visit:    1. Left sided abdominal pain (Primary)  -     XR Abdomen KUB (In Office)  -     HYDROcodone-acetaminophen (Norco) 5-325 MG per tablet; Take 1 tablet by mouth Every 8 (Eight) Hours As Needed for Severe Pain.  Dispense: 9 tablet; Refill: 0    2. Postprandial epigastric pain  -     H. Pylori Antigen, Stool - Stool, Per Rectum; Future    Other orders  -     lubiprostone (Amitiza) 8 MCG capsule; Take 1 capsule by mouth 2 (Two) Times a Day With Meals for 7 days.  Dispense: 14 capsule; Refill: 0    Imaging revealed significant stool burden.  Concern for SBO, therefore, will await radiology report.  Patient also reporting pain after eating.  H. pylori testing, as noted.  Plan until x-ray is resulted - bowel rest with adequate fluids.  Norco for pain control.  If advised of no SBO per x-ray, will recommend the patient start Amitiza.  If needed we will consider a CT scan.  Patient advised that if any significant or alarming symptoms overnight, call on-call clinician or report to an ER.     Follow Up : Will follow up with patient on 10.3.24    Patient was given instructions and counseling regarding her condition or for health maintenance advice. Please see specific information pulled into the AVS if appropriate.

## 2024-10-03 ENCOUNTER — TELEPHONE (OUTPATIENT)
Dept: FAMILY MEDICINE CLINIC | Facility: CLINIC | Age: 54
End: 2024-10-03

## 2024-10-03 ENCOUNTER — HOSPITAL ENCOUNTER (OUTPATIENT)
Dept: CT IMAGING | Facility: HOSPITAL | Age: 54
Discharge: HOME | End: 2024-10-03
Admitting: FAMILY MEDICINE
Payer: OTHER GOVERNMENT

## 2024-10-03 DIAGNOSIS — R10.9 LEFT SIDED ABDOMINAL PAIN: ICD-10-CM

## 2024-10-03 DIAGNOSIS — R10.9 LEFT SIDED ABDOMINAL PAIN: Primary | ICD-10-CM

## 2024-10-03 DIAGNOSIS — K57.92 DIVERTICULITIS: Primary | ICD-10-CM

## 2024-10-03 PROCEDURE — 74177 CT ABD & PELVIS W/CONTRAST: CPT

## 2024-10-03 PROCEDURE — 25510000001 IOPAMIDOL PER 1 ML: Performed by: FAMILY MEDICINE

## 2024-10-03 RX ORDER — METRONIDAZOLE 500 MG/1
500 TABLET ORAL 3 TIMES DAILY
Qty: 21 TABLET | Refills: 0 | Status: SHIPPED | OUTPATIENT
Start: 2024-10-03 | End: 2024-10-10

## 2024-10-03 RX ORDER — SULFAMETHOXAZOLE/TRIMETHOPRIM 800-160 MG
1 TABLET ORAL 2 TIMES DAILY
Qty: 14 TABLET | Refills: 0 | Status: SHIPPED | OUTPATIENT
Start: 2024-10-03 | End: 2024-10-10

## 2024-10-03 RX ORDER — IOPAMIDOL 755 MG/ML
100 INJECTION, SOLUTION INTRAVASCULAR
Status: COMPLETED | OUTPATIENT
Start: 2024-10-03 | End: 2024-10-03

## 2024-10-03 RX ADMIN — IOPAMIDOL 100 ML: 755 INJECTION, SOLUTION INTRAVENOUS at 15:46

## 2024-10-03 NOTE — TELEPHONE ENCOUNTER
Caller: Nora Walker    Relationship: Self    Best call back number: [phone]     What is the best time to reach you: ANY    Who are you requesting to speak with (clinical staff, provider,  specific staff member): CLINICAL    What was the call regarding: PATIENT STATED PCP INSTRUCTED HER TO FOLLOW A LIQUID DIET AND NOT START THE MEDICATION UNTIL SHE HEARS BACK. PLEASE CALL PATIENT WITH AN UPDATE.

## 2024-10-08 ENCOUNTER — CLINICAL SUPPORT (OUTPATIENT)
Dept: FAMILY MEDICINE CLINIC | Facility: CLINIC | Age: 54
End: 2024-10-08
Payer: OTHER GOVERNMENT

## 2024-10-08 DIAGNOSIS — R10.13 POSTPRANDIAL EPIGASTRIC PAIN: ICD-10-CM

## 2024-10-08 LAB — H. PYLORI ANTIGEN STOOL: NEGATIVE

## 2024-10-08 PROCEDURE — 87338 HPYLORI STOOL AG IA: CPT | Performed by: FAMILY MEDICINE

## 2024-11-25 ENCOUNTER — TELEPHONE (OUTPATIENT)
Dept: FAMILY MEDICINE CLINIC | Facility: CLINIC | Age: 54
End: 2024-11-25

## 2024-11-25 ENCOUNTER — OFFICE VISIT (OUTPATIENT)
Dept: FAMILY MEDICINE CLINIC | Facility: CLINIC | Age: 54
End: 2024-11-25
Payer: OTHER GOVERNMENT

## 2024-11-25 VITALS
DIASTOLIC BLOOD PRESSURE: 90 MMHG | OXYGEN SATURATION: 99 % | BODY MASS INDEX: 27.3 KG/M2 | WEIGHT: 174.3 LBS | HEART RATE: 90 BPM | SYSTOLIC BLOOD PRESSURE: 130 MMHG | TEMPERATURE: 97.4 F

## 2024-11-25 DIAGNOSIS — J02.9 PHARYNGITIS, UNSPECIFIED ETIOLOGY: ICD-10-CM

## 2024-11-25 DIAGNOSIS — H10.9 CONJUNCTIVITIS OF LEFT EYE, UNSPECIFIED CONJUNCTIVITIS TYPE: ICD-10-CM

## 2024-11-25 DIAGNOSIS — H66.92 LEFT OTITIS MEDIA, UNSPECIFIED OTITIS MEDIA TYPE: Primary | ICD-10-CM

## 2024-11-25 DIAGNOSIS — H66.92 LEFT OTITIS MEDIA, UNSPECIFIED OTITIS MEDIA TYPE: ICD-10-CM

## 2024-11-25 PROCEDURE — 99213 OFFICE O/P EST LOW 20 MIN: CPT | Performed by: FAMILY MEDICINE

## 2024-11-25 RX ORDER — B-COMPLEX WITH VITAMIN C
1 TABLET ORAL DAILY
COMMUNITY

## 2024-11-25 RX ORDER — ERYTHROMYCIN 5 MG/G
OINTMENT OPHTHALMIC NIGHTLY
Qty: 3.5 G | Refills: 0 | Status: SHIPPED | OUTPATIENT
Start: 2024-11-25

## 2024-11-25 RX ORDER — SACCHAROMYCES BOULARDII 250 MG
250 CAPSULE ORAL 2 TIMES DAILY
Qty: 20 CAPSULE | Refills: 0 | Status: SHIPPED | OUTPATIENT
Start: 2024-11-25 | End: 2024-11-25 | Stop reason: SDUPTHER

## 2024-11-25 RX ORDER — SACCHAROMYCES BOULARDII 250 MG
250 CAPSULE ORAL 2 TIMES DAILY
Qty: 20 CAPSULE | Refills: 0 | Status: SHIPPED | OUTPATIENT
Start: 2024-11-25 | End: 2024-12-05

## 2024-11-25 RX ORDER — ERYTHROMYCIN 5 MG/G
OINTMENT OPHTHALMIC NIGHTLY
Qty: 3.5 G | Refills: 0 | Status: SHIPPED | OUTPATIENT
Start: 2024-11-25 | End: 2024-11-25 | Stop reason: SDUPTHER

## 2024-11-25 NOTE — PROGRESS NOTES
Chief Complaint  Eye Drainage (Yellow drainage)    Subjective          Nora Walker presents to [facility]  History of Present Illness  Recommended mammogram and colonoscopy- pt refused- pt understands risk of not getting exams and that finding cancers early helps in tx- pt still refuses any screening.  URI   This is a new problem. Episode onset: 3 days. The problem has been unchanged. There has been no fever. Associated symptoms include congestion and ear pain. Pertinent negatives include no abdominal pain, chest pain, coughing, diarrhea, dysuria, headaches, joint pain, joint swelling, nausea, neck pain, plugged ear sensation, rash, rhinorrhea, sinus pain, sneezing, sore throat, swollen glands, vomiting or wheezing. She has tried nothing for the symptoms.                Objective   Allergies   Allergen Reactions    Adhesive Tape Rash     RASH FROM SOME TAPES AFTER ON OVER 1 DAY       There is no immunization history on file for this patient.  Past Medical History:   Diagnosis Date    Allergic rhinitis     Arthritis of back 2002    Arthritis of neck 2002    Bursitis of hip 2020    Fracture of wrist 2008    Fracture, foot 2021    Frozen shoulder 2002    GERD (gastroesophageal reflux disease)     Hip arthrosis 2020    Low back pain 2012    Low back strain 2009    Fusion    Neuroma of foot     Possible    Periarthritis of shoulder 2/02    Rheumatoid arthritis     Wrist sprain       Past Surgical History:   Procedure Laterality Date    BACK SURGERY      low back disc    COLONOSCOPY  1998    ENDOMETRIAL ABLATION      FOOT SURGERY      FRACTURE SURGERY  2021    R foot 2nd toe    HAND SURGERY  2010    R wrist    HYSTERECTOMY      SPINE SURGERY  [id number]    Multi    TRIGGER POINT INJECTION      WRIST SURGERY        Social History     Socioeconomic History    Marital status:    Tobacco Use    Smoking status: Never    Smokeless tobacco: Never   Vaping Use    Vaping status: Never  Used   Substance and Sexual Activity    Alcohol use: Not Currently    Drug use: Never    Sexual activity: Yes     Partners: Male     Birth control/protection: Post-menopausal        Current Outpatient Medications:     cyclobenzaprine (FLEXERIL) 10 MG tablet, Take 1 tablet by mouth Daily., Disp: , Rfl:     Rinvoq 15 MG tablet sustained-release 24 hour, , Disp: , Rfl:     amoxicillin-clavulanate (AUGMENTIN) 875-125 MG per tablet, Take 1 tablet by mouth 2 (Two) Times a Day for 7 days., Disp: 14 tablet, Rfl: 0    erythromycin (ROMYCIN) 5 MG/GM ophthalmic ointment, Administer  into the left eye Every Night., Disp: 3.5 g, Rfl: 0    multivitamin with minerals (One Daily Complete) tablet tablet, Take 1 tablet by mouth Daily., Disp: , Rfl:     psyllium (METAMUCIL) 58.6 % packet, Take 1 packet by mouth Daily., Disp: , Rfl:     saccharomyces boulardii (Florastor) 250 MG capsule, Take 1 capsule by mouth 2 (Two) Times a Day for 10 days., Disp: 20 capsule, Rfl: 0   Family History   Problem Relation Age of Onset    Depression Mother     Osteoporosis Mother     Alcohol abuse Mother           Vital Signs:   Vitals:    11/25/24 0858   BP: 130/90   Pulse: 90   Temp: 97.4 °F (36.3 °C)   SpO2: 99%   Weight: 79.1 kg (174 lb 4.8 oz)       Review of Systems   Constitutional:  Negative for fever.   HENT:  Positive for congestion and ear pain. Negative for rhinorrhea, sinus pain, sneezing and sore throat.    Eyes:  Positive for discharge, redness and itching. Negative for photophobia, pain and visual disturbance.   Respiratory:  Negative for cough, chest tightness, shortness of breath and wheezing.    Cardiovascular:  Negative for chest pain, palpitations and leg swelling.   Gastrointestinal:  Negative for abdominal pain, diarrhea, nausea and vomiting.   Genitourinary:  Negative for dysuria.   Musculoskeletal:  Negative for joint pain and neck pain.   Skin:  Negative for rash.   Neurological:  Negative for dizziness, light-headedness and  headaches.      Physical Exam  Vitals reviewed.   Constitutional:       Appearance: Normal appearance. She is well-developed.   HENT:      Head: Normocephalic and atraumatic.      Right Ear: Tympanic membrane, ear canal and external ear normal.      Left Ear: Ear canal and external ear normal.      Ears:      Comments: Left TM cloudy, red.     Nose: Nose normal.      Mouth/Throat:      Mouth: Mucous membranes are moist.      Pharynx: Oropharynx is clear. Posterior oropharyngeal erythema present. No oropharyngeal exudate.   Eyes:      Extraocular Movements: Extraocular movements intact.      Pupils: Pupils are equal, round, and reactive to light.      Comments: Right conjunctiva clear, left conjunctiva red, crusting of left eye.   Cardiovascular:      Rate and Rhythm: Normal rate and regular rhythm.      Pulses: Normal pulses.      Heart sounds: Normal heart sounds. No murmur heard.     No friction rub. No gallop.   Pulmonary:      Effort: Pulmonary effort is normal.      Breath sounds: Normal breath sounds. No wheezing or rhonchi.   Abdominal:      General: Abdomen is flat. Bowel sounds are normal. There is no distension.      Palpations: Abdomen is soft. There is no mass.      Tenderness: There is no abdominal tenderness. There is no guarding or rebound.      Hernia: No hernia is present.   Musculoskeletal:         General: Normal range of motion.      Cervical back: Normal range of motion and neck supple.   Skin:     General: Skin is warm and dry.      Capillary Refill: Capillary refill takes less than 2 seconds.   Neurological:      General: No focal deficit present.      Mental Status: She is alert and oriented to person, place, and time.      Cranial Nerves: No cranial nerve deficit.   Psychiatric:         Mood and Affect: Mood and affect normal.         Behavior: Behavior normal.         Thought Content: Thought content normal.         Judgment: Judgment normal.        Result Review :                  Assessment and Plan    Diagnoses and all orders for this visit:    1. Left otitis media, unspecified otitis media type (Primary)  -     amoxicillin-clavulanate (AUGMENTIN) 875-125 MG per tablet; Take 1 tablet by mouth 2 (Two) Times a Day for 7 days.  Dispense: 14 tablet; Refill: 0  -     saccharomyces boulardii (Florastor) 250 MG capsule; Take 1 capsule by mouth 2 (Two) Times a Day for 10 days.  Dispense: 20 capsule; Refill: 0    2. Conjunctivitis of left eye, unspecified conjunctivitis type  -     erythromycin (ROMYCIN) 5 MG/GM ophthalmic ointment; Administer  into the left eye Every Night.  Dispense: 3.5 g; Refill: 0    3. Pharyngitis, unspecified etiology  -     amoxicillin-clavulanate (AUGMENTIN) 875-125 MG per tablet; Take 1 tablet by mouth 2 (Two) Times a Day for 7 days.  Dispense: 14 tablet; Refill: 0  -     saccharomyces boulardii (Florastor) 250 MG capsule; Take 1 capsule by mouth 2 (Two) Times a Day for 10 days.  Dispense: 20 capsule; Refill: 0            Follow Up   Return if symptoms worsen or fail to improve.  Patient was given instructions and counseling regarding her condition or for health maintenance advice. Please see specific information pulled into the AVS if appropriate.

## 2024-11-25 NOTE — TELEPHONE ENCOUNTER
Caller: Nora Walker    Relationship: Self    Best call back number:[phone]         What was the call regarding:        THE PATIENT SAID SHE PICKED UP HER amoxicillin-clavulanate FROM THE PHARMACY BUT WAS TOLD THEY DID NOT RECEIVED THE      saccharomyces boulardii (Florastor) 250 MG capsule    OR THE      erythromycin (ROMYCIN) 5 MG/GM ophthalmic ointment     SHE IS REQUESTING PCP TO RESEND THESE MEDICATIONS

## 2025-01-13 DIAGNOSIS — E55.9 VITAMIN D DEFICIENCY: ICD-10-CM

## 2025-01-13 DIAGNOSIS — E78.5 HYPERLIPIDEMIA, UNSPECIFIED HYPERLIPIDEMIA TYPE: ICD-10-CM

## 2025-01-13 DIAGNOSIS — Z79.4 ENCOUNTER FOR LONG-TERM (CURRENT) USE OF INSULIN: Primary | ICD-10-CM

## 2025-01-14 ENCOUNTER — LAB (OUTPATIENT)
Dept: LAB | Facility: HOSPITAL | Age: 55
End: 2025-01-14
Payer: OTHER GOVERNMENT

## 2025-01-14 DIAGNOSIS — E78.5 HYPERLIPIDEMIA, UNSPECIFIED HYPERLIPIDEMIA TYPE: ICD-10-CM

## 2025-01-14 DIAGNOSIS — Z79.4 ENCOUNTER FOR LONG-TERM (CURRENT) USE OF INSULIN: ICD-10-CM

## 2025-01-14 DIAGNOSIS — E55.9 VITAMIN D DEFICIENCY: ICD-10-CM

## 2025-01-14 LAB
25(OH)D3 SERPL-MCNC: 87.3 NG/ML (ref 30–100)
ALBUMIN SERPL-MCNC: 4.6 G/DL (ref 3.5–5.2)
ALBUMIN/GLOB SERPL: 1.4 G/DL
ALP SERPL-CCNC: 80 U/L (ref 39–117)
ALT SERPL W P-5'-P-CCNC: 37 U/L (ref 1–33)
ANION GAP SERPL CALCULATED.3IONS-SCNC: 10.4 MMOL/L (ref 5–15)
AST SERPL-CCNC: 45 U/L (ref 1–32)
BASOPHILS # BLD AUTO: 0.07 10*3/MM3 (ref 0–0.2)
BASOPHILS NFR BLD AUTO: 1.4 % (ref 0–1.5)
BILIRUB SERPL-MCNC: 0.5 MG/DL (ref 0–1.2)
BUN SERPL-MCNC: 21 MG/DL (ref 6–20)
BUN/CREAT SERPL: 20 (ref 7–25)
CALCIUM SPEC-SCNC: 9.7 MG/DL (ref 8.6–10.5)
CHLORIDE SERPL-SCNC: 102 MMOL/L (ref 98–107)
CHOLEST SERPL-MCNC: 271 MG/DL (ref 0–200)
CO2 SERPL-SCNC: 24.6 MMOL/L (ref 22–29)
CREAT SERPL-MCNC: 1.05 MG/DL (ref 0.57–1)
CRP SERPL-MCNC: <0.3 MG/DL (ref 0–0.5)
DEPRECATED RDW RBC AUTO: 41.7 FL (ref 37–54)
EGFRCR SERPLBLD CKD-EPI 2021: 62.9 ML/MIN/1.73
EOSINOPHIL # BLD AUTO: 0.16 10*3/MM3 (ref 0–0.4)
EOSINOPHIL NFR BLD AUTO: 3.1 % (ref 0.3–6.2)
ERYTHROCYTE [DISTWIDTH] IN BLOOD BY AUTOMATED COUNT: 12.9 % (ref 12.3–15.4)
GLOBULIN UR ELPH-MCNC: 3.4 GM/DL
GLUCOSE SERPL-MCNC: 108 MG/DL (ref 65–99)
HCT VFR BLD AUTO: 39.4 % (ref 34–46.6)
HDLC SERPL-MCNC: 50 MG/DL (ref 40–60)
HGB BLD-MCNC: 12.7 G/DL (ref 12–15.9)
IMM GRANULOCYTES # BLD AUTO: 0.01 10*3/MM3 (ref 0–0.05)
IMM GRANULOCYTES NFR BLD AUTO: 0.2 % (ref 0–0.5)
LDLC SERPL CALC-MCNC: 196 MG/DL (ref 0–100)
LDLC/HDLC SERPL: 3.87 {RATIO}
LYMPHOCYTES # BLD AUTO: 1.78 10*3/MM3 (ref 0.7–3.1)
LYMPHOCYTES NFR BLD AUTO: 34.4 % (ref 19.6–45.3)
MCH RBC QN AUTO: 28.7 PG (ref 26.6–33)
MCHC RBC AUTO-ENTMCNC: 32.2 G/DL (ref 31.5–35.7)
MCV RBC AUTO: 89.1 FL (ref 79–97)
MONOCYTES # BLD AUTO: 0.51 10*3/MM3 (ref 0.1–0.9)
MONOCYTES NFR BLD AUTO: 9.8 % (ref 5–12)
NEUTROPHILS NFR BLD AUTO: 2.65 10*3/MM3 (ref 1.7–7)
NEUTROPHILS NFR BLD AUTO: 51.1 % (ref 42.7–76)
NRBC BLD AUTO-RTO: 0 /100 WBC (ref 0–0.2)
PLATELET # BLD AUTO: 305 10*3/MM3 (ref 140–450)
PMV BLD AUTO: 10.3 FL (ref 6–12)
POTASSIUM SERPL-SCNC: 4.5 MMOL/L (ref 3.5–5.2)
PROT SERPL-MCNC: 8 G/DL (ref 6–8.5)
RBC # BLD AUTO: 4.42 10*6/MM3 (ref 3.77–5.28)
SODIUM SERPL-SCNC: 137 MMOL/L (ref 136–145)
TRIGL SERPL-MCNC: 137 MG/DL (ref 0–150)
VLDLC SERPL-MCNC: 25 MG/DL (ref 5–40)
WBC NRBC COR # BLD AUTO: 5.18 10*3/MM3 (ref 3.4–10.8)

## 2025-01-14 PROCEDURE — 80061 LIPID PANEL: CPT

## 2025-01-14 PROCEDURE — 80053 COMPREHEN METABOLIC PANEL: CPT

## 2025-01-14 PROCEDURE — 85025 COMPLETE CBC W/AUTO DIFF WBC: CPT

## 2025-01-14 PROCEDURE — 36415 COLL VENOUS BLD VENIPUNCTURE: CPT

## 2025-01-14 PROCEDURE — 86140 C-REACTIVE PROTEIN: CPT

## 2025-01-14 PROCEDURE — 82306 VITAMIN D 25 HYDROXY: CPT

## 2025-01-17 DIAGNOSIS — Z79.4 ENCOUNTER FOR LONG-TERM (CURRENT) USE OF INSULIN: ICD-10-CM

## 2025-01-17 DIAGNOSIS — R94.5 ABNORMAL RESULTS OF LIVER FUNCTION STUDIES: Primary | ICD-10-CM

## 2025-02-12 ENCOUNTER — LAB (OUTPATIENT)
Dept: LAB | Facility: HOSPITAL | Age: 55
End: 2025-02-12
Payer: OTHER GOVERNMENT

## 2025-02-12 DIAGNOSIS — R94.5 ABNORMAL RESULTS OF LIVER FUNCTION STUDIES: ICD-10-CM

## 2025-02-12 DIAGNOSIS — Z79.4 ENCOUNTER FOR LONG-TERM (CURRENT) USE OF INSULIN: ICD-10-CM

## 2025-02-12 PROCEDURE — 82565 ASSAY OF CREATININE: CPT

## 2025-02-12 PROCEDURE — 80076 HEPATIC FUNCTION PANEL: CPT

## 2025-02-12 PROCEDURE — 36415 COLL VENOUS BLD VENIPUNCTURE: CPT

## 2025-02-12 PROCEDURE — 86140 C-REACTIVE PROTEIN: CPT

## 2025-02-13 LAB
ALBUMIN SERPL-MCNC: 4.5 G/DL (ref 3.5–5.2)
ALP SERPL-CCNC: 95 U/L (ref 39–117)
ALT SERPL W P-5'-P-CCNC: 29 U/L (ref 1–33)
AST SERPL-CCNC: 42 U/L (ref 1–32)
BILIRUB CONJ SERPL-MCNC: 0.1 MG/DL (ref 0–0.3)
BILIRUB INDIRECT SERPL-MCNC: 0.2 MG/DL
BILIRUB SERPL-MCNC: 0.3 MG/DL (ref 0–1.2)
CREAT SERPL-MCNC: 0.92 MG/DL (ref 0.57–1)
CRP SERPL-MCNC: 0.98 MG/DL (ref 0–0.5)
EGFRCR SERPLBLD CKD-EPI 2021: 73.7 ML/MIN/1.73
PROT SERPL-MCNC: 8 G/DL (ref 6–8.5)

## 2025-04-10 ENCOUNTER — OFFICE VISIT (OUTPATIENT)
Dept: FAMILY MEDICINE CLINIC | Facility: CLINIC | Age: 55
End: 2025-04-10
Payer: OTHER GOVERNMENT

## 2025-04-10 VITALS
DIASTOLIC BLOOD PRESSURE: 90 MMHG | BODY MASS INDEX: 27.12 KG/M2 | TEMPERATURE: 97.7 F | SYSTOLIC BLOOD PRESSURE: 140 MMHG | HEIGHT: 67 IN | OXYGEN SATURATION: 98 % | WEIGHT: 172.8 LBS | HEART RATE: 77 BPM

## 2025-04-10 DIAGNOSIS — R74.8 ELEVATED LIVER ENZYMES: Primary | ICD-10-CM

## 2025-04-10 DIAGNOSIS — M54.42 ACUTE LEFT-SIDED LOW BACK PAIN WITH LEFT-SIDED SCIATICA: ICD-10-CM

## 2025-04-10 DIAGNOSIS — M25.552 LEFT HIP PAIN: ICD-10-CM

## 2025-04-10 DIAGNOSIS — I10 PRIMARY HYPERTENSION: ICD-10-CM

## 2025-04-10 LAB
ALBUMIN SERPL-MCNC: 4.7 G/DL (ref 3.5–5.2)
ALBUMIN/GLOB SERPL: 1.5 G/DL
ALP SERPL-CCNC: 81 U/L (ref 39–117)
ALT SERPL W P-5'-P-CCNC: 19 U/L (ref 1–33)
ANION GAP SERPL CALCULATED.3IONS-SCNC: 12 MMOL/L (ref 5–15)
AST SERPL-CCNC: 24 U/L (ref 1–32)
BILIRUB SERPL-MCNC: 0.3 MG/DL (ref 0–1.2)
BUN SERPL-MCNC: 13 MG/DL (ref 6–20)
BUN/CREAT SERPL: 14 (ref 7–25)
CALCIUM SPEC-SCNC: 9.8 MG/DL (ref 8.6–10.5)
CHLORIDE SERPL-SCNC: 104 MMOL/L (ref 98–107)
CO2 SERPL-SCNC: 25 MMOL/L (ref 22–29)
CREAT SERPL-MCNC: 0.93 MG/DL (ref 0.57–1)
EGFRCR SERPLBLD CKD-EPI 2021: 72.7 ML/MIN/1.73
GLOBULIN UR ELPH-MCNC: 3.1 GM/DL
GLUCOSE SERPL-MCNC: 102 MG/DL (ref 65–99)
POTASSIUM SERPL-SCNC: 4.2 MMOL/L (ref 3.5–5.2)
PROT SERPL-MCNC: 7.8 G/DL (ref 6–8.5)
SODIUM SERPL-SCNC: 141 MMOL/L (ref 136–145)
T4 FREE SERPL-MCNC: 1.12 NG/DL (ref 0.92–1.68)
TSH SERPL DL<=0.05 MIU/L-ACNC: 2.41 UIU/ML (ref 0.27–4.2)

## 2025-04-10 PROCEDURE — 84443 ASSAY THYROID STIM HORMONE: CPT | Performed by: FAMILY MEDICINE

## 2025-04-10 PROCEDURE — 84439 ASSAY OF FREE THYROXINE: CPT | Performed by: FAMILY MEDICINE

## 2025-04-10 PROCEDURE — 80053 COMPREHEN METABOLIC PANEL: CPT | Performed by: FAMILY MEDICINE

## 2025-04-10 RX ORDER — DEXAMETHASONE SODIUM PHOSPHATE 4 MG/ML
4 INJECTION, SOLUTION INTRA-ARTICULAR; INTRALESIONAL; INTRAMUSCULAR; INTRAVENOUS; SOFT TISSUE ONCE
Status: COMPLETED | OUTPATIENT
Start: 2025-04-10 | End: 2025-04-10

## 2025-04-10 RX ADMIN — DEXAMETHASONE SODIUM PHOSPHATE 4 MG: 4 INJECTION, SOLUTION INTRA-ARTICULAR; INTRALESIONAL; INTRAMUSCULAR; INTRAVENOUS; SOFT TISSUE at 10:55

## 2025-04-10 NOTE — PROGRESS NOTES
Chief Complaint  Annual Exam, Hypertension, and Hip Pain (Left hip pain/)    Subjective          Nora Walker presents to [facility]  History of Present Illness  Pt needs to have liver enzymes checked- has had elevated liver enzymes  Liver looked ok on CT abdomen done last Fall  Left hip pain  and low back pain x 1 month- no recent injury- tried stretching and exercises- no relief- acute onset  Back Pain  This is a new problem. The current episode started more than 1 month ago. The problem occurs constantly. The problem is unchanged. The pain is present in the lumbar spine. The quality of the pain is described as aching. Radiates to: left hip. The pain is moderate. The symptoms are aggravated by bending, lying down and twisting. Pertinent negatives include no abdominal pain, bladder incontinence, bowel incontinence, chest pain, dysuria, fever, headaches, leg pain, numbness, paresis, paresthesias, pelvic pain, perianal numbness, tingling, weakness or weight loss. She has tried NSAIDs, home exercises and muscle relaxant for the symptoms. The treatment provided mild relief.                Objective   Allergies   Allergen Reactions    Adhesive Tape Rash     RASH FROM SOME TAPES AFTER ON OVER 1 DAY       There is no immunization history on file for this patient.  Past Medical History:   Diagnosis Date    Allergic rhinitis     Arthritis of back 2002    Arthritis of neck 2002    Bursitis of hip 2020    Fracture of wrist 2008    Fracture, foot 2021    Frozen shoulder 2002    GERD (gastroesophageal reflux disease)     Hip arthrosis 2020    Low back pain 2012    Low back strain 2009    Fusion    Neuroma of foot     Possible    Periarthritis of shoulder 2/02    Rheumatoid arthritis     Wrist sprain       Past Surgical History:   Procedure Laterality Date    BACK SURGERY      low back disc    COLONOSCOPY  1998    ENDOMETRIAL ABLATION      FOOT SURGERY      FRACTURE SURGERY  2021    R foot  "2nd toe    HAND SURGERY  2010    R wrist    HYSTERECTOMY      SPINE SURGERY  [id number]    Multi    TRIGGER POINT INJECTION      WRIST SURGERY        Social History     Socioeconomic History    Marital status:    Tobacco Use    Smoking status: Never    Smokeless tobacco: Never   Vaping Use    Vaping status: Never Used   Substance and Sexual Activity    Alcohol use: Not Currently    Drug use: Never    Sexual activity: Yes     Partners: Male     Birth control/protection: Post-menopausal        Current Outpatient Medications:     cyclobenzaprine (FLEXERIL) 10 MG tablet, Take 1 tablet by mouth Daily., Disp: , Rfl:     diclofenac (VOLTAREN) 50 MG EC tablet, Take 1 tablet by mouth Every 12 (Twelve) Hours., Disp: , Rfl:     erythromycin (ROMYCIN) 5 MG/GM ophthalmic ointment, Administer  into the left eye Every Night., Disp: 3.5 g, Rfl: 0    multivitamin with minerals (One Daily Complete) tablet tablet, Take 1 tablet by mouth Daily., Disp: , Rfl:     psyllium (METAMUCIL) 58.6 % packet, Take 1 packet by mouth Daily., Disp: , Rfl:     Rinvoq 15 MG tablet sustained-release 24 hour, , Disp: , Rfl:   No current facility-administered medications for this visit.   Family History   Problem Relation Age of Onset    Depression Mother     Osteoporosis Mother     Alcohol abuse Mother           Vital Signs:   Vitals:    04/10/25 1005   BP: 140/90   Pulse: 77   Temp: 97.7 °F (36.5 °C)   SpO2: 98%   Weight: 78.4 kg (172 lb 12.8 oz)   Height: 170.2 cm (67\")       Review of Systems   Constitutional:  Negative for fatigue, fever and weight loss.   HENT:  Negative for sore throat.    Eyes:  Negative for visual disturbance.   Respiratory:  Negative for apnea, cough, shortness of breath and wheezing.    Cardiovascular:  Negative for chest pain, palpitations and leg swelling.   Gastrointestinal:  Negative for abdominal pain, bowel incontinence, diarrhea, nausea and vomiting.   Genitourinary:  Negative for bladder incontinence, dysuria and " pelvic pain.   Musculoskeletal:  Positive for back pain.   Neurological:  Negative for dizziness, tingling, weakness, light-headedness, numbness, headaches and paresthesias.      Physical Exam  Vitals reviewed.   Constitutional:       Appearance: Normal appearance. She is well-developed.   HENT:      Head: Normocephalic and atraumatic.      Right Ear: External ear normal.      Left Ear: External ear normal.      Mouth/Throat:      Pharynx: No oropharyngeal exudate.   Eyes:      Conjunctiva/sclera: Conjunctivae normal.      Pupils: Pupils are equal, round, and reactive to light.   Cardiovascular:      Rate and Rhythm: Normal rate and regular rhythm.      Pulses: Normal pulses.      Heart sounds: Normal heart sounds. No murmur heard.     No friction rub. No gallop.   Pulmonary:      Effort: Pulmonary effort is normal.      Breath sounds: Normal breath sounds. No wheezing or rhonchi.   Abdominal:      General: Abdomen is flat. Bowel sounds are normal. There is no distension.      Palpations: Abdomen is soft. There is no mass.      Tenderness: There is no abdominal tenderness. There is no guarding or rebound.      Hernia: No hernia is present.   Musculoskeletal:         General: Normal range of motion.      Comments: Left lower back paraspinal muscle tenderness, neg straight leg raise, no vertebrae tenderness, normal ROM, stable, no redness, warmth, swelling, or bruising.   Skin:     General: Skin is warm and dry.      Capillary Refill: Capillary refill takes less than 2 seconds.   Neurological:      General: No focal deficit present.      Mental Status: She is alert and oriented to person, place, and time.      Cranial Nerves: No cranial nerve deficit.   Psychiatric:         Mood and Affect: Mood and affect normal.         Behavior: Behavior normal.         Thought Content: Thought content normal.         Judgment: Judgment normal.        Result Review :   The following data was reviewed by: Zaid Renteria MD on  04/10/2025:  CMP          7/17/2024    13:16 1/14/2025    10:08 2/12/2025    11:30   CMP   Glucose  108     BUN  21     Creatinine  1.05  0.92    EGFR  62.9  73.7    Sodium  137     Potassium  4.5     Chloride  102     Calcium  9.7     Total Protein  8.0  8.0    Albumin 4.7  4.6  4.5    Globulin  3.4     Total Bilirubin  0.5  0.3    Alkaline Phosphatase  80  95    AST (SGOT) 24  45  42    ALT (SGPT) 17  37  29    Albumin/Globulin Ratio  1.4     BUN/Creatinine Ratio  20.0     Anion Gap  10.4       CBC          7/17/2024    13:16 1/14/2025    10:08   CBC   WBC 4.56  5.18    RBC 4.44  4.42    Hemoglobin 12.8  12.7    Hematocrit 39.7  39.4    MCV 89.4  89.1    MCH 28.8  28.7    MCHC 32.2  32.2    RDW 12.9  12.9    Platelets 277  305      Lipid Panel          1/14/2025    10:08   Lipid Panel   Total Cholesterol 271    Triglycerides 137    HDL Cholesterol 50    VLDL Cholesterol 25    LDL Cholesterol  196    LDL/HDL Ratio 3.87        Data reviewed : Radiologic studies I viewed and interpreted 2 views left hip and 2 views lumbar spine x-rays:no fxs.           Assessment and Plan    Diagnoses and all orders for this visit:    1. Elevated liver enzymes (Primary)  -     Comprehensive Metabolic Panel    2. Primary hypertension  -     TSH+Free T4    3. Left hip pain  -     XR Hip With or Without Pelvis 2 - 3 View Left  -     Cancel: Ambulatory Referral to Physical Therapy for Evaluation & Treatment  -     dexAMETHasone (DECADRON) injection 4 mg    4. Acute left-sided low back pain with left-sided sciatica  -     XR Spine Lumbar 2 or 3 View (In Office)  -     Cancel: Ambulatory Referral to Physical Therapy for Evaluation & Treatment  -     dexAMETHasone (DECADRON) injection 4 mg            Follow Up   Return in about 3 weeks (around 5/1/2025) for Recheck.  Patient was given instructions and counseling regarding her condition or for health maintenance advice. Please see specific information pulled into the AVS if appropriate.

## 2025-04-10 NOTE — PROGRESS NOTES
..  Venipuncture Blood Specimen Collection  Venipuncture performed in LT arm by Danae Cee with good hemostasis. Patient tolerated the procedure well without complications.   04/10/25   Terrie Adhikari MA

## 2025-05-12 DIAGNOSIS — M54.42 ACUTE LEFT-SIDED LOW BACK PAIN WITH LEFT-SIDED SCIATICA: ICD-10-CM

## 2025-05-12 DIAGNOSIS — M25.552 LEFT HIP PAIN: Primary | ICD-10-CM

## 2025-06-02 ENCOUNTER — OFFICE VISIT (OUTPATIENT)
Dept: FAMILY MEDICINE CLINIC | Facility: CLINIC | Age: 55
End: 2025-06-02
Payer: OTHER GOVERNMENT

## 2025-06-02 VITALS
OXYGEN SATURATION: 97 % | WEIGHT: 173.9 LBS | BODY MASS INDEX: 27.29 KG/M2 | TEMPERATURE: 98.2 F | DIASTOLIC BLOOD PRESSURE: 90 MMHG | HEIGHT: 67 IN | SYSTOLIC BLOOD PRESSURE: 146 MMHG | HEART RATE: 109 BPM

## 2025-06-02 DIAGNOSIS — R20.0 NUMBNESS OF LEGS: ICD-10-CM

## 2025-06-02 DIAGNOSIS — M54.42 CHRONIC BILATERAL LOW BACK PAIN WITH BILATERAL SCIATICA: ICD-10-CM

## 2025-06-02 DIAGNOSIS — G89.29 CHRONIC BILATERAL LOW BACK PAIN WITH BILATERAL SCIATICA: ICD-10-CM

## 2025-06-02 DIAGNOSIS — R15.9 INCONTINENCE OF FECES, UNSPECIFIED FECAL INCONTINENCE TYPE: ICD-10-CM

## 2025-06-02 DIAGNOSIS — R29.898 WEAKNESS OF BOTH LEGS: Primary | ICD-10-CM

## 2025-06-02 DIAGNOSIS — M54.41 CHRONIC BILATERAL LOW BACK PAIN WITH BILATERAL SCIATICA: ICD-10-CM

## 2025-06-02 PROBLEM — R07.9 CHEST PAIN: Status: ACTIVE | Noted: 2025-06-02

## 2025-06-02 PROBLEM — R00.2 PALPITATIONS: Status: ACTIVE | Noted: 2025-06-02

## 2025-06-02 PROCEDURE — 99213 OFFICE O/P EST LOW 20 MIN: CPT | Performed by: FAMILY MEDICINE

## 2025-06-02 RX ORDER — CYCLOBENZAPRINE HCL 5 MG
10 TABLET ORAL 3 TIMES DAILY PRN
Qty: 90 TABLET | Refills: 1 | Status: SHIPPED | OUTPATIENT
Start: 2025-06-02

## 2025-06-02 RX ORDER — DICLOFENAC SODIUM 75 MG/1
75 TABLET, DELAYED RELEASE ORAL EVERY 12 HOURS SCHEDULED
Qty: 60 TABLET | Refills: 1 | Status: SHIPPED | OUTPATIENT
Start: 2025-06-02

## 2025-06-02 RX ORDER — LIDOCAINE 50 MG/G
2 PATCH TOPICAL EVERY 24 HOURS
Qty: 60 PATCH | Refills: 1 | Status: SHIPPED | OUTPATIENT
Start: 2025-06-02

## 2025-06-02 NOTE — PROGRESS NOTES
Chief Complaint  Back Pain (Pt f/u back and bilateral hip pain. Pt states she is no better. Pt thinks it is coming from her surgery site. Had xrays 04/10) and Fatigue (Pt states she has leg weakness in bilateral legs at night x several months)    Subjective          Nora Walker presents to [facility]  History of Present Illness  Pt has worsening low back pain that radiates down both legs- numbness- pt has had weakness in both legs pt says at night- pt says that she has had intermittent bowel incontinence over last 2 yrs- pt has had previous back surgery in 2012  Back Pain  Chronicity:  Chronic  Onset:  More than 1 year ago  Frequency:  Constantly  Progression since onset:  Worsening  Pain location:  Lumbar spine  Radiates to:  Left foot and right foot  Pain severity:  Moderate  Aggravated by:  Bending, lying down, twisting and standing  Associated symptoms: bowel incontinence, numbness, paresthesias, tingling and weakness    Associated symptoms: no abdominal pain, no bladder incontinence, no chest pain, no dysuria, no fever, no headaches, no leg pain, no paresis, no pelvic pain, no perianal numbness and no weight loss    Treatments tried:  Physical therapy, muscle relaxant, NSAIDs, heat, ice and analgesics  Improvement on treatment:  No relief               Objective   Allergies   Allergen Reactions    Adhesive Tape Rash     RASH FROM SOME TAPES AFTER ON OVER 1 DAY       There is no immunization history on file for this patient.  Past Medical History:   Diagnosis Date    Abnormal ECG     Allergic rhinitis     Arthritis of back 2002    Arthritis of neck 2002    Bursitis of hip 2020    Fracture of wrist 2008    Fracture, foot 2021    Frozen shoulder 2002    GERD (gastroesophageal reflux disease)     Hip arthrosis 2020    Low back pain 2012    Low back strain 2009    Fusion    Neuroma of foot     Possible    Periarthritis of shoulder 2/02    Rheumatoid arthritis     Wrist sprain   "     Past Surgical History:   Procedure Laterality Date    BACK SURGERY      low back disc     SECTION  2005    COLONOSCOPY  1998    ENDOMETRIAL ABLATION      FOOT SURGERY      FRACTURE SURGERY      R foot 2nd toe    HAND SURGERY      R wrist    HYSTERECTOMY      SPINE SURGERY  [id number]    Multi    TRIGGER POINT INJECTION      WRIST SURGERY        Social History     Socioeconomic History    Marital status:    Tobacco Use    Smoking status: Never    Smokeless tobacco: Never   Vaping Use    Vaping status: Never Used   Substance and Sexual Activity    Alcohol use: Not Currently    Drug use: Never    Sexual activity: Yes     Partners: Male     Birth control/protection: Post-menopausal        Current Outpatient Medications:     cyclobenzaprine (FLEXERIL) 5 MG tablet, Take 2 tablets by mouth 3 (Three) Times a Day As Needed for Muscle Spasms., Disp: 90 tablet, Rfl: 1    diclofenac (VOLTAREN) 75 MG EC tablet, Take 1 tablet by mouth Every 12 (Twelve) Hours., Disp: 60 tablet, Rfl: 1    erythromycin (ROMYCIN) 5 MG/GM ophthalmic ointment, Administer  into the left eye Every Night., Disp: 3.5 g, Rfl: 0    multivitamin with minerals (One Daily Complete) tablet tablet, Take 1 tablet by mouth Daily., Disp: , Rfl:     psyllium (METAMUCIL) 58.6 % packet, Take 1 packet by mouth Daily., Disp: , Rfl:     Rinvoq 15 MG tablet sustained-release 24 hour, , Disp: , Rfl:     lidocaine (Lidoderm) 5 %, Place 2 patches on the skin as directed by provider Daily. Remove & Discard patch within 12 hours or as directed by MD, Disp: 60 patch, Rfl: 1   Family History   Problem Relation Age of Onset    Depression Mother     Osteoporosis Mother     Alcohol abuse Mother           Vital Signs:   Vitals:    25 1022   BP: 146/90   BP Location: Left arm   Patient Position: Sitting   Cuff Size: Adult   Pulse: 109   Temp: 98.2 °F (36.8 °C)   TempSrc: Temporal   SpO2: 97%   Weight: 78.9 kg (173 lb 14.4 oz)   Height: 170.2 cm (67\") "       Review of Systems   Constitutional:  Negative for fatigue, fever and weight loss.   HENT:  Negative for sore throat.    Eyes:  Negative for visual disturbance.   Respiratory:  Negative for apnea, cough, shortness of breath and wheezing.    Cardiovascular:  Negative for chest pain, palpitations and leg swelling.   Gastrointestinal:  Positive for bowel incontinence. Negative for abdominal pain, diarrhea, nausea and vomiting.   Genitourinary:  Negative for bladder incontinence, dysuria and pelvic pain.   Skin:  Negative for rash.   Neurological:  Positive for tingling, weakness, numbness and paresthesias. Negative for dizziness, light-headedness and headaches.      Physical Exam  Vitals reviewed.   Constitutional:       Appearance: Normal appearance. She is well-developed.   HENT:      Head: Normocephalic and atraumatic.      Right Ear: External ear normal.      Left Ear: External ear normal.      Mouth/Throat:      Pharynx: No oropharyngeal exudate.   Eyes:      Conjunctiva/sclera: Conjunctivae normal.      Pupils: Pupils are equal, round, and reactive to light.   Cardiovascular:      Rate and Rhythm: Normal rate and regular rhythm.      Pulses: Normal pulses.      Heart sounds: Normal heart sounds. No murmur heard.     No friction rub. No gallop.   Pulmonary:      Effort: Pulmonary effort is normal.      Breath sounds: Normal breath sounds. No wheezing or rhonchi.   Abdominal:      General: Abdomen is flat. Bowel sounds are normal. There is no distension.      Palpations: Abdomen is soft. There is no mass.      Tenderness: There is no abdominal tenderness. There is no guarding or rebound.      Hernia: No hernia is present.   Musculoskeletal:         General: Normal range of motion.      Comments: Bilateral lower back paraspinal muscle tenderness, positive bilateral straight leg raise, no vertebrae tenderness, no redness, warmth, swelling, or bruising.   Skin:     General: Skin is warm and dry.      Capillary  Refill: Capillary refill takes less than 2 seconds.   Neurological:      General: No focal deficit present.      Mental Status: She is alert and oriented to person, place, and time.      Cranial Nerves: No cranial nerve deficit.   Psychiatric:         Mood and Affect: Mood and affect normal.         Behavior: Behavior normal.         Thought Content: Thought content normal.         Judgment: Judgment normal.        Result Review :                 Assessment and Plan    Diagnoses and all orders for this visit:    1. Weakness of both legs (Primary)    2. Numbness of legs    3. Incontinence of feces, unspecified fecal incontinence type    4. Chronic bilateral low back pain with bilateral sciatica  -     MRI Lumbar Spine With & Without Contrast; Future  -     cyclobenzaprine (FLEXERIL) 5 MG tablet; Take 2 tablets by mouth 3 (Three) Times a Day As Needed for Muscle Spasms.  Dispense: 90 tablet; Refill: 1  -     lidocaine (Lidoderm) 5 %; Place 2 patches on the skin as directed by provider Daily. Remove & Discard patch within 12 hours or as directed by MD  Dispense: 60 patch; Refill: 1  -     diclofenac (VOLTAREN) 75 MG EC tablet; Take 1 tablet by mouth Every 12 (Twelve) Hours.  Dispense: 60 tablet; Refill: 1            Follow Up   Return in about 1 week (around 6/9/2025) for Recheck.  Patient was given instructions and counseling regarding her condition or for health maintenance advice. Please see specific information pulled into the AVS if appropriate.

## 2025-06-03 ENCOUNTER — HOSPITAL ENCOUNTER (OUTPATIENT)
Dept: MRI IMAGING | Facility: HOSPITAL | Age: 55
Discharge: HOME | End: 2025-06-03
Admitting: FAMILY MEDICINE
Payer: OTHER GOVERNMENT

## 2025-06-03 ENCOUNTER — TELEPHONE (OUTPATIENT)
Dept: FAMILY MEDICINE CLINIC | Facility: CLINIC | Age: 55
End: 2025-06-03
Payer: OTHER GOVERNMENT

## 2025-06-03 DIAGNOSIS — M25.552 LEFT HIP PAIN: Primary | ICD-10-CM

## 2025-06-03 DIAGNOSIS — M25.552 BILATERAL HIP PAIN: ICD-10-CM

## 2025-06-03 DIAGNOSIS — M54.41 CHRONIC BILATERAL LOW BACK PAIN WITH BILATERAL SCIATICA: ICD-10-CM

## 2025-06-03 DIAGNOSIS — M54.42 CHRONIC BILATERAL LOW BACK PAIN WITH BILATERAL SCIATICA: ICD-10-CM

## 2025-06-03 DIAGNOSIS — G89.29 CHRONIC BILATERAL LOW BACK PAIN WITH BILATERAL SCIATICA: ICD-10-CM

## 2025-06-03 DIAGNOSIS — M25.551 BILATERAL HIP PAIN: ICD-10-CM

## 2025-06-03 PROCEDURE — A9573 GADOPICLENOL 0.5 MMOL/ML SOLUTION: HCPCS | Performed by: FAMILY MEDICINE

## 2025-06-03 PROCEDURE — 25510000001 GADOPICLENOL 0.5 MMOL/ML SOLUTION: Performed by: FAMILY MEDICINE

## 2025-06-03 PROCEDURE — 72158 MRI LUMBAR SPINE W/O & W/DYE: CPT

## 2025-06-03 RX ADMIN — GADOPICLENOL 7.5 ML: 485.1 INJECTION INTRAVENOUS at 07:51

## 2025-06-03 NOTE — TELEPHONE ENCOUNTER
Radiology Dept called and just wanted to make sure you received the MRI she had done yesterday.  I told her it was in the pt chart and I would let you know.

## 2025-06-18 ENCOUNTER — OFFICE VISIT (OUTPATIENT)
Dept: ORTHOPEDIC SURGERY | Facility: CLINIC | Age: 55
End: 2025-06-18
Payer: OTHER GOVERNMENT

## 2025-06-18 VITALS — HEIGHT: 67 IN | BODY MASS INDEX: 27.15 KG/M2 | WEIGHT: 173 LBS

## 2025-06-18 DIAGNOSIS — M16.0 BILATERAL PRIMARY OSTEOARTHRITIS OF HIP: ICD-10-CM

## 2025-06-18 DIAGNOSIS — M25.551 RIGHT HIP PAIN: Primary | ICD-10-CM

## 2025-06-18 DIAGNOSIS — M48.061 SPINAL STENOSIS OF LUMBAR REGION, UNSPECIFIED WHETHER NEUROGENIC CLAUDICATION PRESENT: ICD-10-CM

## 2025-06-18 NOTE — PROGRESS NOTES
"Chief Complaint  Pain and Initial Evaluation of the Left Hip and Pain and Initial Evaluation of the Right Hip    Subjective          Nora Walker presents to [facility] for an evaluation  of bilateral hip.     History of Present Illness    The patient presents here today for an evaluation  of bilateral hip. Her hips have been bothering her for several years but has gradually gotten worse. She has had prior surgery to her back. She has pain pain that radiates down her legs and has a constant ache to her legs. She denies any groin pain.      Allergies   Allergen Reactions    Adhesive Tape Rash     RASH FROM SOME TAPES AFTER ON OVER 1 DAY        Social History     Socioeconomic History    Marital status:    Tobacco Use    Smoking status: Never    Smokeless tobacco: Never   Vaping Use    Vaping status: Never Used   Substance and Sexual Activity    Alcohol use: Not Currently    Drug use: Never    Sexual activity: Yes     Partners: Male     Birth control/protection: Post-menopausal        I reviewed the patient's chief complaint, history of present illness, review of systems, past medical history, surgical history, family history, social history, medications, and allergy list.     REVIEW OF SYSTEMS    Constitutional: Denies fevers, chills, weight loss  Cardiovascular: Denies chest pain, shortness of breath  Skin: Denies rashes, acute skin changes  Neurologic: Denies headache, loss of consciousness  MSK: bilateral hip pain       Objective   Vital Signs:   Ht 170.2 cm (67\")   Wt 78.5 kg (173 lb)   BMI 27.10 kg/m²     Body mass index is 27.1 kg/m².    Physical Exam    General: Alert. No acute distress.   Bilateral lower extremity: hip flexion  to 90 degrees, external rotation  to 30 degrees, internal rotation to 25 degrees, no pain with hip range of motion, negative  straight leg raise, 5/5 hip flexion  and abduction, distal neurovascularly intact, positive  pulses, positive EHL, " FHL, GS, and TA. Sensation intact to all 5 nerves of the foot.      Procedures    Imaging Results (Most Recent)       Procedure Component Value Units Date/Time    XR Hip With or Without Pelvis 2 - 3 View Right [[id number]] Resulted: 06/18/25 1157     Updated: 06/18/25 1157    Narrative:      Indications: Right hip pain    Views: AP pelvis, AP and frog lateral right hip    Findings: Mild degenerative changes are present bilaterally.  No   fractures.  Hips are reduced.  Spinal fusion hardware is partially   visualized    Comparative Data: No comparative data available                       Assessment and Plan        XR Hip With or Without Pelvis 2 - 3 View Right  Result Date: 6/18/2025  Narrative: Indications: Right hip pain Views: AP pelvis, AP and frog lateral right hip Findings: Mild degenerative changes are present bilaterally.  No fractures.  Hips are reduced.  Spinal fusion hardware is partially visualized Comparative Data: No comparative data available     MRI Lumbar Spine With & Without Contrast  Result Date: 6/3/2025  Narrative: MRI LUMBAR SPINE W WO CONTRAST Date of Exam: 6/3/2025 7:40 AM EDT Indication: low back pain with bowel incontinence and numbness and weakness both legs at night when lying down.  Comparison: 11/30/2020 Technique:  Routine multiplanar/multisequence sequence images of the lumbar spine were obtained before and after the uneventful administration of Multihance. Findings: ALIGNMENT: Normal DISK SPACE: Normal VERTEBRA: Posterior fusion with discectomy and laminectomy from L4-S1. No fracture or destructive process. No marrow edema.  No significant active nor fatty endplate change. CORD: Distal spinal cord and conus medullaris appear unremarkable terminating above the L2 level.  SOFT TISSUES: Paraspinal musculature and visualized abdominopelvic contents are unremarkable.  No mass nor lymphadenopathy. No significant abnormal postcontrast enhancement identified. LEVELS: T12-L1: No focal disc  protrusion or extrusion. Facet joints appear unremarkable. No significant neural foraminal central canal, or spinal canal stenosis. L1-L2: No focal disc protrusion or extrusion. Facet joints appear unremarkable. No significant neural foraminal, central canal, or spinal canal stenosis. L2-L3: Mild broad-based posterior disc bulge and facet arthropathy. Trace lateral recess and neuroforaminal stenosis. No significant neuroforaminal stenosis L3-L4: Mild broad-based posterior disc bulge. Mild facet arthropathy ligamentum flavum thickening. There is mild lateral recess, central canal, and neuroforaminal stenosis. L4-L5: Discectomy with laminectomy and posterior fusion. No significant neural foraminal or spinal canal stenosis. L5-S1: Discectomy with laminectomy and posterior fusion. There is mild residual endplate osteophyte formation resulting in relatively mild lateral neuroforaminal stenosis.     Impression: Impression: 1.Minimal multifactorial degenerative change of the mid lumbar spine. No significant central canal, lateral recess, nor neural foraminal stenosis. 2.Postoperative change of lower lumbar spine are similar to prior exam. No pathologic enhancement Electronically Signed: Salvador Wayne MD  6/3/2025 8:27 AM EDT  Workstation ID: [id number]       Diagnoses and all orders for this visit:    1. Right hip pain (Primary)  -     XR Hip With or Without Pelvis 2 - 3 View Right    2. Bilateral primary osteoarthritis of hip    3. Spinal stenosis of lumbar region, unspecified whether neurogenic claudication present  -     Ambulatory Referral to Orthopedic Surgery      The patient presents here today for an evaluation  of bilateral hip. X-rays were obtained in the office today and these were reviewed today.     She denies any pain to her groin region. I believe her pain is related to her lumbar spine pathology and previous surgery.  Referral placed today for Chela regarding her lumbar spine.     Discussed placing intra  articular steroid injections with radiology in the future if she gets any groin pain. She will call if and when she wishes to proceed.      Call or return if worsening symptoms.    Scribed for Castro Ro MD by Nirmala Chambers  06/18/2025   10:47 EDT     Follow Up     PRN     Patient was given instructions and counseling regarding her condition or for health maintenance advice. Please see specific information pulled into the AVS if appropriate.       I have personally performed the services described in this document as scribed by the above individual and it is both accurate and complete. Castro Ro MD 06/18/25 12:23 EDT

## 2025-07-09 ENCOUNTER — TELEPHONE (OUTPATIENT)
Dept: FAMILY MEDICINE CLINIC | Facility: CLINIC | Age: 55
End: 2025-07-09
Payer: OTHER GOVERNMENT

## 2025-07-09 NOTE — TELEPHONE ENCOUNTER
Caller: Nora Walker    Relationship to patient: Self    Best call back number: [phone]    Patient is needing: Patient called in and is needing a new referral to Dr. Monk. Patient said she has an appointment with him at the end of the month. Patient said it is okay to leave a message on phone.

## 2025-07-25 ENCOUNTER — TELEPHONE (OUTPATIENT)
Dept: FAMILY MEDICINE CLINIC | Facility: CLINIC | Age: 55
End: 2025-07-25
Payer: OTHER GOVERNMENT

## 2025-07-25 DIAGNOSIS — Z78.0 POSTMENOPAUSAL: Primary | ICD-10-CM

## 2025-07-25 NOTE — TELEPHONE ENCOUNTER
Spoke to patient and informed her [facility] [employer] is showing office requested as not in network.  Submitted request and was approved

## 2025-07-25 NOTE — TELEPHONE ENCOUNTER
Caller: Nora Walker    Relationship: Self    Best call back number: [phone]     What is the best time to reach you: ANYTIME     Who are you requesting to speak with (clinical staff, provider,  specific staff member): CLINICAL     What was the call regarding: PATIENT IS CALLING UPDATED REFERRAL IS NEEDED FOR THE PATIENT RHEUMATOLOGY OFFICE.       [facility]   [phone]  [address]  [place] [address]